# Patient Record
Sex: FEMALE | Race: WHITE | NOT HISPANIC OR LATINO | Employment: FULL TIME | URBAN - METROPOLITAN AREA
[De-identification: names, ages, dates, MRNs, and addresses within clinical notes are randomized per-mention and may not be internally consistent; named-entity substitution may affect disease eponyms.]

---

## 2019-08-13 ENCOUNTER — OFFICE VISIT (OUTPATIENT)
Dept: FAMILY MEDICINE | Facility: CLINIC | Age: 24
End: 2019-08-13
Payer: COMMERCIAL

## 2019-08-13 VITALS
DIASTOLIC BLOOD PRESSURE: 62 MMHG | OXYGEN SATURATION: 98 % | WEIGHT: 155 LBS | SYSTOLIC BLOOD PRESSURE: 90 MMHG | TEMPERATURE: 97.9 F | HEART RATE: 53 BPM | BODY MASS INDEX: 24.33 KG/M2 | HEIGHT: 67 IN

## 2019-08-13 DIAGNOSIS — D68.51 FACTOR 5 LEIDEN MUTATION, HETEROZYGOUS (H): Primary | ICD-10-CM

## 2019-08-13 DIAGNOSIS — M67.40 GANGLION CYST: ICD-10-CM

## 2019-08-13 PROCEDURE — 99213 OFFICE O/P EST LOW 20 MIN: CPT | Performed by: NURSE PRACTITIONER

## 2019-08-13 RX ORDER — ADAPALENE GEL USP, 0.3% 3 MG/G
GEL TOPICAL AT BEDTIME
COMMUNITY
End: 2023-06-29

## 2019-08-13 ASSESSMENT — MIFFLIN-ST. JEOR: SCORE: 1481.74

## 2019-10-24 ENCOUNTER — OFFICE VISIT (OUTPATIENT)
Dept: HEMATOLOGY | Facility: CLINIC | Age: 24
End: 2019-10-24
Attending: NURSE PRACTITIONER
Payer: COMMERCIAL

## 2019-10-24 VITALS
SYSTOLIC BLOOD PRESSURE: 118 MMHG | WEIGHT: 155.2 LBS | HEIGHT: 67 IN | DIASTOLIC BLOOD PRESSURE: 70 MMHG | RESPIRATION RATE: 14 BRPM | OXYGEN SATURATION: 99 % | BODY MASS INDEX: 24.36 KG/M2 | HEART RATE: 61 BPM

## 2019-10-24 DIAGNOSIS — D68.51 FACTOR 5 LEIDEN MUTATION, HETEROZYGOUS (H): ICD-10-CM

## 2019-10-24 PROCEDURE — 99203 OFFICE O/P NEW LOW 30 MIN: CPT | Performed by: INTERNAL MEDICINE

## 2019-10-24 PROCEDURE — G0463 HOSPITAL OUTPT CLINIC VISIT: HCPCS

## 2019-10-24 ASSESSMENT — PAIN SCALES - GENERAL: PAINLEVEL: NO PAIN (0)

## 2019-10-24 ASSESSMENT — MIFFLIN-ST. JEOR: SCORE: 1481.61

## 2019-10-24 NOTE — PROGRESS NOTES
[facility]  [address] [place] [id number]  Phone: [phone], Fax: [phone]    Outpatient Visit Note:    Patient: Margot Menezes  MRN: [id number]  : 1995  ANCA: 2019    Reason for Consultation:  Margot Menezes is a referred by Daly Orta NP for evaluation and treatment of FVL heterozygosity.    History of Present Illness:  Margot Menezes is a 24 year old healthy woman who was found to have FVL heterozygosity after her father tested positive for this after unprovoked VTE.  She reports no personal history of VTE.  She is primarily interested in understanding the significance of factor V Leiden and whether this would require any primary prophylactic therapy.  She wonders if this is any impact on her health risk for venous thrombosis.    Past Medical History:  Past Medical History:   Diagnosis Date     Factor 5 Leiden mutation, heterozygous (H)        Past Surgical History:  Past Surgical History:   Procedure Laterality Date     MAMMOPLASTY REDUCTION BILATERAL       TONSILLECTOMY         Medications:  Current Outpatient Medications   Medication Sig     adapalene (DIFFERIN) 0.3 % external gel Apply topically At Bedtime     No current facility-administered medications for this visit.        Allergies:  No Known Allergies    ROS:  A 14 point ROS is negative except as stated in the HPI    Social History:  Denies any tobacco use. No significant alcohol use. Denies any illicit drug use.    Family History:  The only family member she is aware of that has had a blood clot as her father who had a recently diagnosed unprovoked pulmonary embolism    Objective:  Vitals: B/P: 118/70, T: Data Unavailable, P: 61, R: 14, Wt: 155 lbs 3.2 oz Body mass index is 24.31 kg/m .   Exam:   Gen: Appears well, no distress  Remainder of the exam is deferred as the visit is primarily for counseling    Labs:  She is heterozygous for factor V  Leiden    Imaging:  None    Assessment:  In summary, Margot Menezes is a 24 year old woman with FVL heterozygosity and family history of unprovoked VTE in her father.  She is not at sufficient risk to justify primary prophylaxis with anticoagulation but I encouraged her to avoid estrogen (OCP) and consider prophylaxis with pregnancy (6 weeks post partum is likely sufficient vs careful observation) and surgery/hospitalzation    Plan:  1. Majority of today's visit was spent counseling the patient regarding the clinical relevance of FVL heterozygosity  2. Avoid estrogen use of possible (we discussed alternatives including progestin-only pilss and devices as well as IUDs)  3. Consider prophylactic anticoagulation 6 weeks post partum (or during pregnancy particularly if her family history changes)  4. Prophylaxis with surgery or hospitalization.  5. Gave her some literature about inherited thromobophilias in current practice for her reading.    The patient is given our center's contact information and is instructed to call if she should have any further questions or concerns.      Total Time Spent:  I spent a total of 30 minutes face-to-face with Margot Menezes during today's office visit.  Over 50% of this time was spent counseling the patient and/or coordinating care regarding FV Leiden and risk of VTE.        Sim Rivera MD   of Medicine  [facility]

## 2019-10-25 NOTE — NURSING NOTE
Ms. Menezes is a 24 year old woman heterozygote for Factor V Leiden and family history of unprovoked VTE.   VTE risk factors, signs, and symptoms reviewed.  Heterozygosity for Factor V Leiden as a VTE risk factor discussed.  Factor V Leiden and concerns for increased VTE risk with estrogen exposure reviewed.   Periods of increased VTE risk and role of pharmacological VTE prophylaxis discussed.  AVS provided.  Ms. Menezes has the contact information for the Center and was encouraged to call with questions or concerns.

## 2020-07-30 ENCOUNTER — OFFICE VISIT (OUTPATIENT)
Dept: FAMILY MEDICINE | Facility: CLINIC | Age: 25
End: 2020-07-30
Payer: COMMERCIAL

## 2020-07-30 VITALS
SYSTOLIC BLOOD PRESSURE: 120 MMHG | WEIGHT: 147 LBS | RESPIRATION RATE: 16 BRPM | BODY MASS INDEX: 23.07 KG/M2 | DIASTOLIC BLOOD PRESSURE: 77 MMHG | TEMPERATURE: 97.3 F | HEART RATE: 64 BPM | HEIGHT: 67 IN

## 2020-07-30 DIAGNOSIS — Z12.4 SCREENING FOR CERVICAL CANCER: ICD-10-CM

## 2020-07-30 DIAGNOSIS — Z00.00 ROUTINE GENERAL MEDICAL EXAMINATION AT A HEALTH CARE FACILITY: Primary | ICD-10-CM

## 2020-07-30 DIAGNOSIS — M76.32 IT BAND SYNDROME, LEFT: ICD-10-CM

## 2020-07-30 PROCEDURE — 90715 TDAP VACCINE 7 YRS/> IM: CPT | Performed by: FAMILY MEDICINE

## 2020-07-30 PROCEDURE — G0145 SCR C/V CYTO,THINLAYER,RESCR: HCPCS | Performed by: FAMILY MEDICINE

## 2020-07-30 PROCEDURE — 99395 PREV VISIT EST AGE 18-39: CPT | Mod: 25 | Performed by: FAMILY MEDICINE

## 2020-07-30 PROCEDURE — 90651 9VHPV VACCINE 2/3 DOSE IM: CPT | Performed by: FAMILY MEDICINE

## 2020-07-30 PROCEDURE — 90471 IMMUNIZATION ADMIN: CPT | Performed by: FAMILY MEDICINE

## 2020-07-30 PROCEDURE — 90472 IMMUNIZATION ADMIN EACH ADD: CPT | Performed by: FAMILY MEDICINE

## 2020-07-30 ASSESSMENT — MIFFLIN-ST. JEOR: SCORE: 1436.48

## 2020-07-30 NOTE — NURSING NOTE
Prior to immunization administration, verified patients identity using patient s name and date of birth. Please see Immunization Activity for additional information.     Screening Questionnaire for Adult Immunization    Are you sick today?   No   Do you have allergies to medications, food, a vaccine component or latex?   No   Have you ever had a serious reaction after receiving a vaccination?   No   Do you have a long-term health problem with heart, lung, kidney, or metabolic disease (e.g., diabetes), asthma, a blood disorder, no spleen, complement component deficiency, a cochlear implant, or a spinal fluid leak?  Are you on long-term aspirin therapy?   No   Do you have cancer, leukemia, HIV/AIDS, or any other immune system problem?   No   Do you have a parent, brother, or sister with an immune system problem?   No   In the past 3 months, have you taken medications that affect  your immune system, such as prednisone, other steroids, or anticancer drugs; drugs for the treatment of rheumatoid arthritis, Crohn s disease, or psoriasis; or have you had radiation treatments?   No   Have you had a seizure, or a brain or other nervous system problem?   No   During the past year, have you received a transfusion of blood or blood    products, or been given immune (gamma) globulin or antiviral drug?   No   For women: Are you pregnant or is there a chance you could become       pregnant during the next month?   No   Have you received any vaccinations in the past 4 weeks?   No     Immunization questionnaire answers were all negative.        Per orders of Dr. Shell, injection of hpv and tdap given by Yudelka Leonard MA. Patient instructed to remain in clinic for 15 minutes afterwards, and to report any adverse reaction to me immediately.       Screening performed by Yudelka Leonard MA on 7/30/2020 at 9:28 AM.

## 2020-07-30 NOTE — PROGRESS NOTES
SUBJECTIVE:   CC: Margot Menezes is an 24 year old woman who presents for preventive health visit.     Healthy Habits:    Do you get at least three servings of calcium containing foods daily (dairy, green leafy vegetables, etc.)? yes    Amount of exercise or daily activities, outside of work: 7 day(s) per week    Problems taking medications regularly No    Medication side effects: No    Have you had an eye exam in the past two years? no    Do you see a dentist twice per year? yes    Do you have sleep apnea, excessive snoring or daytime drowsiness?no    Current Chronic Medical Conditions  Factor V Leiden, heterozygous 2013    Family History  Dad- Factor V Leiden mutation  Mom- HTN  One younger brother    Social History  [facility]phros [facility] and [place] [employer] [place] [employer]. Lives alone.  Sexually active. Runner.  Vegetarian.  Mom is pharmacist.  Went to [employer].    HCM   Desires IUD.  IT band syndrome LEFT LE- had to stop running in March- feels it is improving but worried it might come back.  Has tried exercises at home.  Might be interest in PT.    Due for PAP and Tdap and HPV today.  Defers labs.    Today's PHQ-2 Score:   PHQ-2 ( 1999 Pfizer) 8/13/2019   Q1: Little interest or pleasure in doing things 0   Q2: Feeling down, depressed or hopeless 0   PHQ-2 Score 0       Abuse: Current or Past(Physical, Sexual or Emotional)- No  Do you feel safe in your environment? Yes        Social History     Tobacco Use     Smoking status: Never Smoker     Smokeless tobacco: Never Used   Substance Use Topics     Alcohol use: Yes     Comment: 2-3 drinks a week     If you drink alcohol do you typically have >3 drinks per day or >7 drinks per week? No                     Reviewed orders with patient.  Reviewed health maintenance and updated orders accordingly - Yes  BP Readings from Last 3 Encounters:   07/30/20 120/77   10/24/19 118/70   08/13/19  90/62    Wt Readings from Last 3 Encounters:   07/30/20 66.7 kg (147 lb)   10/24/19 70.4 kg (155 lb 3.2 oz)   08/13/19 70.3 kg (155 lb)                  Patient Active Problem List   Diagnosis     Factor 5 Leiden mutation, heterozygous (H)     Ganglion cyst     Past Surgical History:   Procedure Laterality Date     MAMMOPLASTY REDUCTION BILATERAL       TONSILLECTOMY         Social History     Tobacco Use     Smoking status: Never Smoker     Smokeless tobacco: Never Used   Substance Use Topics     Alcohol use: Yes     Comment: 2-3 drinks a week     Family History   Problem Relation Age of Onset     Hypertension Mother      Factor IX deficiency Father      Charcot-Alka-Tooth disease Brother          Current Outpatient Medications   Medication Sig Dispense Refill     adapalene (DIFFERIN) 0.3 % external gel Apply topically At Bedtime       No Known Allergies  No lab results found.     Mammogram not appropriate for this patient based on age.    Pertinent mammograms are reviewed under the imaging tab.  History of abnormal Pap smear: NO - age 21-29 PAP every 3 years recommended     Reviewed and updated as needed this visit by clinical staff         Reviewed and updated as needed this visit by Provider            ROS:  CONSTITUTIONAL: NEGATIVE for fever, chills, change in weight  INTEGUMENTARU/SKIN: NEGATIVE for worrisome rashes, moles or lesions  EYES: NEGATIVE for vision changes or irritation  ENT: NEGATIVE for ear, mouth and throat problems  RESP: NEGATIVE for significant cough or SOB  BREAST: NEGATIVE for masses, tenderness or discharge  CV: NEGATIVE for chest pain, palpitations or peripheral edema  GI: NEGATIVE for nausea, abdominal pain, heartburn, or change in bowel habits  : NEGATIVE for unusual urinary or vaginal symptoms. Periods are regular.  MUSCULOSKELETAL: NEGATIVE for significant arthralgias or myalgia  NEURO: NEGATIVE for weakness, dizziness or paresthesias  PSYCHIATRIC: NEGATIVE for changes in mood or  "affect    OBJECTIVE:   /77   Pulse 64   Temp 97.3  F (36.3  C) (Oral)   Resp 16   Ht 1.689 m (5' 6.5\")   Wt 66.7 kg (147 lb)   LMP 07/17/2020   BMI 23.37 kg/m      EXAM:  GENERAL: healthy, alert and no distress  EYES: Eyes grossly normal to inspection, PERRL and conjunctivae and sclerae normal  HENT: ear canals and TM's normal, nose and mouth without ulcers or lesions  NECK: no adenopathy, no asymmetry, masses, or scars and thyroid normal to palpation  RESP: lungs clear to auscultation - no rales, rhonchi or wheezes  BREAST: normal without masses, tenderness or nipple discharge and no palpable axillary masses or adenopathy  CV: regular rate and rhythm, normal S1 S2, no S3 or S4, no murmur, click or rub, no peripheral edema and peripheral pulses strong  ABDOMEN: soft, nontender, no hepatosplenomegaly, no masses and bowel sounds normal   (female): normal female external genitalia, normal urethral meatus, vaginal mucosa pink, moist, well rugated, and normal cervix/adnexa/uterus without masses or discharge  MS: no gross musculoskeletal defects noted, no edema  SKIN: no suspicious lesions or rashes  NEURO: Normal strength and tone, mentation intact and speech normal  PSYCH: mentation appears normal, affect normal/bright    ASSESSMENT/PLAN:   1. Routine general medical examination at a health care facility    - OB/GYN REFERRAL    Referred to OB for IUD.  Continue excellent diet and exercise.    2. Screening for cervical cancer    - PAP imaged thin layer screen reflex to HPV if ASCUS - recommended age 25 - 29 years  - C HUMAN PAPILLOMA VIRUS VACCINE (GARDASIL 9) 3 DOSE IM  - VACCINE ADMINISTRATION, INITIAL  - TDAP VACCINE (ADACEL)    3. It band syndrome, left    - PHYSICAL THERAPY REFERRAL; Future    Referred to PT to prevent recurrent injury.    COUNSELING:   Reviewed preventive health counseling, as reflected in patient instructions       Regular exercise       Healthy diet/nutrition       Contraception    " "   Safe sex practices/STD prevention    Estimated body mass index is 24.31 kg/m  as calculated from the following:    Height as of 10/24/19: 1.702 m (5' 7\").    Weight as of 10/24/19: 70.4 kg (155 lb 3.2 oz).         reports that she has never smoked. She has never used smokeless tobacco.      Counseling Resources:  ATP IV Guidelines  Pooled Cohorts Equation Calculator  Breast Cancer Risk Calculator  FRAX Risk Assessment  ICSI Preventive Guidelines  Dietary Guidelines for Americans, 2010  USDA's MyPlate  ASA Prophylaxis  Lung CA Screening    Ana María Mccarthy MD  [facility]  "

## 2020-08-03 LAB
COPATH REPORT: NORMAL
PAP: NORMAL

## 2020-08-20 ENCOUNTER — THERAPY VISIT (OUTPATIENT)
Dept: PHYSICAL THERAPY | Facility: CLINIC | Age: 25
End: 2020-08-20
Payer: COMMERCIAL

## 2020-08-20 DIAGNOSIS — M76.32 ILIOTIBIAL BAND SYNDROME, LEFT: Primary | ICD-10-CM

## 2020-08-20 PROCEDURE — 97112 NEUROMUSCULAR REEDUCATION: CPT | Mod: GP | Performed by: PHYSICAL THERAPIST

## 2020-08-20 PROCEDURE — 97161 PT EVAL LOW COMPLEX 20 MIN: CPT | Mod: GP | Performed by: PHYSICAL THERAPIST

## 2020-08-20 ASSESSMENT — ACTIVITIES OF DAILY LIVING (ADL)
HOW_WOULD_YOU_RATE_THE_CURRENT_FUNCTION_OF_YOUR_KNEE_DURING_YOUR_USUAL_DAILY_ACTIVITIES_ON_A_SCALE_FROM_0_TO_100_WITH_100_BEING_YOUR_LEVEL_OF_KNEE_FUNCTION_PRIOR_TO_YOUR_INJURY_AND_0_BEING_THE_INABILITY_TO_PERFORM_ANY_OF_YOUR_USUAL_DAILY_ACTIVITIES?: 100
RAW_SCORE: 67
KNEE_ACTIVITY_OF_DAILY_LIVING_SUM: 67
STAND: ACTIVITY IS NOT DIFFICULT
SQUAT: ACTIVITY IS NOT DIFFICULT
WEAKNESS: I HAVE THE SYMPTOM BUT IT DOES NOT AFFECT MY ACTIVITY
HOW_WOULD_YOU_RATE_THE_OVERALL_FUNCTION_OF_YOUR_KNEE_DURING_YOUR_USUAL_DAILY_ACTIVITIES?: NEARLY NORMAL
WALK: ACTIVITY IS NOT DIFFICULT
AS_A_RESULT_OF_YOUR_KNEE_INJURY,_HOW_WOULD_YOU_RATE_YOUR_CURRENT_LEVEL_OF_DAILY_ACTIVITY?: NEARLY NORMAL
GO DOWN STAIRS: ACTIVITY IS NOT DIFFICULT
KNEE_ACTIVITY_OF_DAILY_LIVING_SCORE: 95.71
KNEEL ON THE FRONT OF YOUR KNEE: ACTIVITY IS NOT DIFFICULT
LIMPING: I DO NOT HAVE THE SYMPTOM
GIVING WAY, BUCKLING OR SHIFTING OF KNEE: I DO NOT HAVE THE SYMPTOM
STIFFNESS: THE SYMPTOM AFFECTS MY ACTIVITY SLIGHTLY
PAIN: I DO NOT HAVE THE SYMPTOM
SIT WITH YOUR KNEE BENT: ACTIVITY IS NOT DIFFICULT
GO UP STAIRS: ACTIVITY IS NOT DIFFICULT
SWELLING: I DO NOT HAVE THE SYMPTOM
RISE FROM A CHAIR: ACTIVITY IS NOT DIFFICULT

## 2020-08-20 NOTE — PROGRESS NOTES
Physical Therapy Initial Evaluation  August 20, 2020     Precautions/Restrictions/MD instructions: PT eval and treat.     Subjective:   Date of Onset: March 2020, MD order on 7/30/20  C/C: left lateral hip and thigh pain, posterior thigh pain, and occasional calf pain  Quality of pain is aching and tightness. Pains are described as intermittent in nature. Pain is worse: on the go. Tightness is rated 1-3/10.  Pain was rated 0-6/10.   History of symptoms: Pains began gradually as the result of training for a marathon. Since onset, symptoms are improving. Previous episodes: mild anterior knee pain in 2018 following agility work outs  Worsened by: walking, running, sitting,   Alleviated by:    Rest period - did not help much, foam rolling, strength/stretching  General health as reported by patient: excellent  Pertinent medical/surgical history: She denies pertinent PMH.   Imaging: none. Current occupational status: . Patient's goals are: decrease pain, improve tolerance to sitting the day after running, resume training for and running a marathon without risk of injury. Return to MD:  PRN.         Objective:  KNEE:    PROM:   L  R   Hyperextension 0 0   Extension 0 0   Flexion 140 140     Strength:   L R   HIP     Flex 4+/5 4+/5   Ext 4-/5 4+/5   ER 3+/5 4+/5   Abd 4-/5 4+/5   KNEE     Flex 5/5 5/5   Ext 5/5 5/5     Hip PROM:     L R   IR 60 45   ER 60 45   Flexion 135 135     Lumbar ROM: WNL - does not affect symptoms    Assessment/Plan:    The patient is a 24 year old female with chief complaint of left leg pain consistent with ITB syndrome vs micro instability at her left hip.    The patient has the following significant findings with corresponding treatment plan.  Diagnosis 1:  L ITB syndrome    Pain -  hot/cold therapy, manual therapy, splint/taping/bracing/orthotics and home program  Decreased strength - therapeutic exercise and therapeutic activities  Impaired balance - neuro  re-education and therapeutic activities  Decreased proprioception - neuro re-education and therapeutic activities  Impaired muscle performance - neuro re-education  Decreased function - therapeutic activities  Impaired posture - neuro re-education          Therapy Evaluation Codes:     Cumulative Therapy Evaluation is: Low complexity.    Previous and current functional limitations:  (See Goal Flow Sheet for this information)    Short term and Long term goals: (See Goal Flow Sheet for this information)     Communication ability:  Patient appears to be able to clearly communicate and understand verbal and written communication and follow directions correctly.  Treatment Explanation - The following has been discussed with the patient: RX ordered/plan of care, anticipated outcomes, and possible risks and side effects.  This patient would benefit from PT intervention to resume normal activities.   Rehab potential is good.    Frequency:  2 X a month, once daily  Duration:  for 2 months  Discharge Plan: Achieve all LTGs, be independent in home treatment program, and reach maximal therapeutic benefit.    Please refer to the daily flowsheet for treatment today, total treatment time and time spent performing 1:1 timed codes.

## 2020-08-21 ENCOUNTER — OFFICE VISIT (OUTPATIENT)
Dept: OBGYN | Facility: CLINIC | Age: 25
End: 2020-08-21
Payer: COMMERCIAL

## 2020-08-21 VITALS
DIASTOLIC BLOOD PRESSURE: 76 MMHG | SYSTOLIC BLOOD PRESSURE: 126 MMHG | WEIGHT: 148 LBS | TEMPERATURE: 98.1 F | HEART RATE: 61 BPM | BODY MASS INDEX: 23.53 KG/M2

## 2020-08-21 DIAGNOSIS — Z30.430 ENCOUNTER FOR IUD INSERTION: Primary | ICD-10-CM

## 2020-08-21 DIAGNOSIS — Z30.430 ENCOUNTER FOR INSERTION OF INTRAUTERINE CONTRACEPTIVE DEVICE (IUD): ICD-10-CM

## 2020-08-21 LAB — HCG UR QL: NEGATIVE

## 2020-08-21 PROCEDURE — 87491 CHLMYD TRACH DNA AMP PROBE: CPT | Performed by: OBSTETRICS & GYNECOLOGY

## 2020-08-21 PROCEDURE — 58300 INSERT INTRAUTERINE DEVICE: CPT | Performed by: OBSTETRICS & GYNECOLOGY

## 2020-08-21 PROCEDURE — 81025 URINE PREGNANCY TEST: CPT | Performed by: OBSTETRICS & GYNECOLOGY

## 2020-08-21 PROCEDURE — 87591 N.GONORRHOEAE DNA AMP PROB: CPT | Performed by: OBSTETRICS & GYNECOLOGY

## 2020-08-21 NOTE — NURSING NOTE
"Chief Complaint   Patient presents with     IUD     consult/Placement     consult/Placement Mirena NDC:44856-668-76 LOT:[id number] EXP:10/2022  Initial /76 (BP Location: Left arm, Patient Position: Sitting, Cuff Size: Adult Regular)   Pulse 61   Temp 98.1  F (36.7  C) (Oral)   Wt 67.1 kg (148 lb)   BMI 23.53 kg/m   Estimated body mass index is 23.53 kg/m  as calculated from the following:    Height as of 7/30/20: 1.689 m (5' 6.5\").    Weight as of this encounter: 67.1 kg (148 lb).  BP completed using cuff size: regular    Questioned patient about current smoking habits.  Pt. has never smoked.      No obstetric history on file.    The following HM Due: NONE      The following patient reported/Care Every where data was sent to:  P ABSTRACT QUALITY INITIATIVES [[id number]]  RYAN Gleason MA           "

## 2020-08-22 NOTE — PROGRESS NOTES
IUD INSERTION PROCEDURE    Margot Menezes is a 24 year old female  who presents for Mirena IUD insertion.  Indication for IUD insertion is contraception.  No LMP recorded. .  The patient is currently using oral contraceptives for contraception.  She is not in a monogamous sexual relationship.     Lab Results   Component Value Date    PAP NIL 2020     GC/CT today  Results for orders placed or performed in visit on 20   HCG Qual, Urine ([id number])     Status: None   Result Value Ref Range    HCG Qual Urine Negative NEG^Negative       A complete discussion of the risks and benefits of IUD use and the details of the insertion procedure was held with the patient.    All questions were answered.  A consent form was signed.    Prior to the beginning of the procedure the team paused to verify the patient's identity, as well as the procedure to be performed and the site.  All equipment required was ready and available. The patient was positioned appropriately.     IUD Lot # Mirena NDC:61005-236-58 LOT:[id number] EXP:10/2022    The patient was placed in low lithotomy.  A bimanual exam was performed and the uterus noted to be retroverted.  A speculum was placed and the cervix swabbed with Betadine.  A tenaculum was placed on the anterior cervical lip. A paracervical block was performed.  The fundus sounded to 8 cm. The Mirena IUD was placed to the uterine fundus without difficulty.  The strings were cut to 3 cms.  The tenaculum was removed and hemostasis was ensured.  The speculum was removed.  The patient tolerated the procedure well.    PLAN:   The patient was asked to contact the clinic for any fever/chills/severe pelvic or abdominal pain or heavy bleeding. She was instructed in how to palpate her IUD strings.    FOLLOW-UP:  She was asked to follow up prn, and for her routine annual screening.

## 2020-08-23 LAB
C TRACH DNA SPEC QL NAA+PROBE: NEGATIVE
N GONORRHOEA DNA SPEC QL NAA+PROBE: NEGATIVE
SPECIMEN SOURCE: NORMAL
SPECIMEN SOURCE: NORMAL

## 2020-09-11 ENCOUNTER — THERAPY VISIT (OUTPATIENT)
Dept: PHYSICAL THERAPY | Facility: CLINIC | Age: 25
End: 2020-09-11
Payer: COMMERCIAL

## 2020-09-11 DIAGNOSIS — M76.32 ILIOTIBIAL BAND SYNDROME, LEFT: ICD-10-CM

## 2020-09-11 PROCEDURE — 97110 THERAPEUTIC EXERCISES: CPT | Mod: GP | Performed by: PHYSICAL THERAPIST

## 2020-09-11 PROCEDURE — 97112 NEUROMUSCULAR REEDUCATION: CPT | Mod: GP | Performed by: PHYSICAL THERAPIST

## 2020-09-24 ENCOUNTER — THERAPY VISIT (OUTPATIENT)
Dept: PHYSICAL THERAPY | Facility: CLINIC | Age: 25
End: 2020-09-24
Payer: COMMERCIAL

## 2020-09-24 DIAGNOSIS — M76.32 ILIOTIBIAL BAND SYNDROME, LEFT: ICD-10-CM

## 2020-09-24 PROCEDURE — 97140 MANUAL THERAPY 1/> REGIONS: CPT | Mod: GP | Performed by: PHYSICAL THERAPIST

## 2020-09-24 PROCEDURE — 97110 THERAPEUTIC EXERCISES: CPT | Mod: GP | Performed by: PHYSICAL THERAPIST

## 2020-09-24 PROCEDURE — 97112 NEUROMUSCULAR REEDUCATION: CPT | Mod: GP | Performed by: PHYSICAL THERAPIST

## 2020-10-02 ENCOUNTER — TRANSFERRED RECORDS (OUTPATIENT)
Dept: HEALTH INFORMATION MANAGEMENT | Facility: CLINIC | Age: 25
End: 2020-10-02

## 2020-10-09 ENCOUNTER — THERAPY VISIT (OUTPATIENT)
Dept: PHYSICAL THERAPY | Facility: CLINIC | Age: 25
End: 2020-10-09
Payer: COMMERCIAL

## 2020-10-09 DIAGNOSIS — M76.32 ILIOTIBIAL BAND SYNDROME, LEFT: Primary | ICD-10-CM

## 2020-10-09 PROCEDURE — 97110 THERAPEUTIC EXERCISES: CPT | Mod: GP | Performed by: PHYSICAL THERAPIST

## 2020-10-09 PROCEDURE — 97112 NEUROMUSCULAR REEDUCATION: CPT | Mod: GP | Performed by: PHYSICAL THERAPIST

## 2020-10-09 PROCEDURE — 97140 MANUAL THERAPY 1/> REGIONS: CPT | Mod: GP | Performed by: PHYSICAL THERAPIST

## 2020-10-23 ENCOUNTER — THERAPY VISIT (OUTPATIENT)
Dept: PHYSICAL THERAPY | Facility: CLINIC | Age: 25
End: 2020-10-23
Payer: COMMERCIAL

## 2020-10-23 DIAGNOSIS — M76.32 ILIOTIBIAL BAND SYNDROME, LEFT: ICD-10-CM

## 2020-10-23 PROCEDURE — 97110 THERAPEUTIC EXERCISES: CPT | Mod: GP | Performed by: PHYSICAL THERAPIST

## 2020-10-23 PROCEDURE — 97112 NEUROMUSCULAR REEDUCATION: CPT | Mod: GP | Performed by: PHYSICAL THERAPIST

## 2020-10-23 NOTE — PROGRESS NOTES
Subjective:  The history is provided by the patient. No  was used.     Physical Exam                    Objective:  Standing Alignment:    Cervical/Thoracic:  Forward head  Shoulder/UE:  Rounded shoulders  Lumbar:  Normal                                                                  Knee Evaluation:              Functional Testing:          Quad:    Single Leg Squat:  Left:         No pain  Mild loss of control and excessive anterior knee excursion  Right:      No pain  Mild loss of control and excessive anterior knee excursion  Bilateral Leg Squat:  No pain  Normal control              General     ROS    Assessment/Plan:    DISCHARGE REPORT    Progress reporting period is from 8/20/20 to 10/23/20.       SUBJECTIVE  Subjective changes noted by patient:  Pt able to run without pain. She is no longer having pain and tightness with sitting the day after running. She has been performing her HEP. She has been more aware of her posture.       Current Pain level: 0/10.     Initial Pain level: 3/10 tightness at ITB.   Changes in function:  Yes (See Goal flowsheet attached for changes in current functional level)  Adverse reaction to treatment or activity: None    OBJECTIVE  Changes noted in objective findings:  Yes, see objective findings.    ASSESSMENT/PLAN  Updated problem list and treatment plan: Diagnosis 1:  L ITB syndrome  Decreased strength - therapeutic exercise, therapeutic activities and home program  Decreased proprioception - neuro re-education, therapeutic activities and home program  Impaired muscle performance - neuro re-education and home program  Decreased function - therapeutic activities and home program  Impaired posture - neuro re-education and home program  STG/LTGs have been met or progress has been made towards goals:  Yes (See Goal flow sheet completed today.)  Assessment of Progress: The patient's condition is improving.  Self Management Plans:  Patient is independent in a  home treatment program.  Patient is independent in self management of symptoms.  I have re-evaluated this patient and find that the nature, scope, duration and intensity of the therapy is appropriate for the medical condition of the patient.  Margot continues to require the following intervention to meet STG and LTG's:  PT intervention is no longer required to meet STG/LTG.    Recommendations:  This patient is ready to be discharged from therapy and continue their home treatment program.    Please refer to the daily flowsheet for treatment today, total treatment time and time spent performing 1:1 timed codes.

## 2020-10-25 PROBLEM — M76.32 ILIOTIBIAL BAND SYNDROME, LEFT: Status: RESOLVED | Noted: 2020-08-20 | Resolved: 2020-10-25

## 2021-01-03 ENCOUNTER — HEALTH MAINTENANCE LETTER (OUTPATIENT)
Age: 26
End: 2021-01-03

## 2021-02-18 ENCOUNTER — NURSE TRIAGE (OUTPATIENT)
Dept: NURSING | Facility: CLINIC | Age: 26
End: 2021-02-18

## 2021-02-19 ENCOUNTER — OFFICE VISIT (OUTPATIENT)
Dept: FAMILY MEDICINE | Facility: CLINIC | Age: 26
End: 2021-02-19
Payer: COMMERCIAL

## 2021-02-19 VITALS
OXYGEN SATURATION: 99 % | DIASTOLIC BLOOD PRESSURE: 75 MMHG | SYSTOLIC BLOOD PRESSURE: 126 MMHG | BODY MASS INDEX: 24.59 KG/M2 | HEIGHT: 66 IN | TEMPERATURE: 97.7 F | HEART RATE: 62 BPM | WEIGHT: 153 LBS

## 2021-02-19 DIAGNOSIS — R20.0 NUMBNESS OF RIGHT FOOT: ICD-10-CM

## 2021-02-19 DIAGNOSIS — Z82.0 FAMILY HISTORY OF NEUROPATHY: ICD-10-CM

## 2021-02-19 DIAGNOSIS — S93.401A SPRAIN OF RIGHT ANKLE, UNSPECIFIED LIGAMENT, INITIAL ENCOUNTER: Primary | ICD-10-CM

## 2021-02-19 PROCEDURE — 99213 OFFICE O/P EST LOW 20 MIN: CPT | Performed by: INTERNAL MEDICINE

## 2021-02-19 ASSESSMENT — MIFFLIN-ST. JEOR: SCORE: 1458.62

## 2021-02-19 NOTE — TELEPHONE ENCOUNTER
Pt called stating on monday around 5 pm, she fell on her foot/ ankle wrong by tripping over. Pt reported she has a lot of bruising on the top of her right ankle, and moderate swelling. She reported the bruising extends to the back of the heel, middle of the foot, and ankle. Pt reported pain rated 1/10, and she has been taking ibuprofen 600 mg 3 times day.     Pt reported she cant walk much on her right leg only few stapes. Pt reported she was limping on monday but not anymore.  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      Per protocol pt was advised to be seen within 24 hours, pt stated okay, and was transferred to scheduling. Protocol care advice provided and pt verbalized understanding.    Torres Poole RN  [facility]       COVID 19 Nurse Triage Plan/Patient Instructions    Please be aware that novel coronavirus (COVID-19) may be circulating in the community. If you develop symptoms such as fever, cough, or SOB or if you have concerns about the presence of another infection including coronavirus (COVID-19), please contact your health care provider or visit [email or web address].     Disposition/Instructions    In-Person Visit with provider recommended. Reference Visit Selection Guide.    Thank you for taking steps to prevent the spread of this virus.  o Limit your contact with others.  o Wear a simple mask to cover your cough.  o Wash your hands well and often.    Resources    [facility]: About COVID-19: [email or web address]    CDC: What to Do If You're Sick: www.cdc.gov/coronavirus/2019-ncov/about/steps-when-sick.html    CDC: Ending Home Isolation: www.cdc.gov/coronavirus/2019-ncov/hcp/disposition-in-home-patients.html     CDC: Caring for Someone: www.cdc.gov/coronavirus/2019-ncov/if-you-are-sick/care-for-someone.html     [facility]: Interim Guidance for Hospital Discharge to Home: www.health.[facility].[place]/diseases/coronavirus/hcp/hospdischarge.pdf    [facility] clinical trials  (COVID-19 research studies): clinicalaffairs.[facility]-clinical-trials     Below are the COVID-19 hotlines at the [place] Department of Health ([facility]). Interpreters are available.   o For health questions: Call 108-889-7491 or 1-348.141.1671 (7 a.m. to 7 p.m.)  o For questions about schools and childcare: Call 453-831-2981 or 1-788.904.8530 (7 a.m. to 7 p.m.)     Additional Information    Negative: Serious injury with multiple fractures    Negative: [1] Major bleeding (e.g., actively dripping or spurting) AND [2] can't be stopped    Negative: Amputation    Negative: Looks like a dislocated joint (very crooked or deformed)    Negative: Sounds like a life-threatening emergency to the triager    Negative: Wound looks infected    Negative: Caused by an animal bite    Negative: Caused by a human bite    Negative: Puncture wound of foot    Negative: Toe injury is main concern    Negative: Cast problems or questions    Negative: Bullet wound, stabbed by knife, or other serious penetrating wound    Negative: Skin is split open or gaping (or length > 1/2 inch or 12 mm)    Negative: [1] Bleeding AND [2] won't stop after 10 minutes of direct pressure (using correct technique)    Negative: [1] Dirt in the wound AND [2] not removed with 15 minutes of scrubbing    Negative: Can't stand (bear weight) or walk    Negative: [1] Numbness (new loss of sensation) of toe(s) AND [2] present now    Negative: Sounds like a serious injury to the triager    Negative: [1] SEVERE pain AND [2] not improved 2 hours after pain medicine/ice packs    Negative: Suspicious history for the injury    Large swelling or bruise (> 2 inches or 5 cm)    Negative: [1] Limp when walking AND [2] due to a direct blow or crushing injury    Negative: [1] Limp when walking AND [2] due to a twisted ankle or foot    Protocols used: FOOT AND ANKLE INJURY-A-AH

## 2021-02-19 NOTE — PROGRESS NOTES
"    Assessment & Plan   Problem List Items Addressed This Visit        Musculoskeletal and Integumentary    Sprain of right ankle - Primary      Other Visit Diagnoses     Numbness of right foot        Family history of neuropathy             Brother with charcot Alka syndrome tooth neuropathy has has lot of falls,     RICE, ACE WRAP, f/u PT    concern with Fx of neuropathy, Patient reports foot gave away \"foot fell asleep\" and tripped fell, unknown trigger   Injury     Can have see orthopedics,.[facility]port Medicine,     Was concerned with elevated BP , repeat was 126/75, REASSURANCE GIVEN, she will need labs, CMP, lipid,        MEDICATIONS:  Continue current medications without change  Work on weight loss  Regular exercise  See Patient Instructions    Return in about 4 weeks (around 3/19/2021), or if symptoms worsen or fail to improve.    Lesly Arenas MD  [facility] VIDAL Frost is a 25 year old who presents for the following health issues     HPI       Musculoskeletal problem/pain  Onset/Duration: Monday around 5 pm   Description  Location: foot and ankle - right  Joint Swelling: YES- moderate  Redness: YES  Pain: YES  Warmth: no  Intensity:  1/10  Progression of Symptoms:  improving  Accompanying signs and symptoms:   Fevers: no  Numbness/tingling/weakness: YES- NUMBNESS   History  Trauma to the area: YES- fell on her foot/ankle wrong by tripping over  Recent illness:  no  Previous similar problem: no  Previous evaluation:  no  Precipitating or alleviating factors:  Aggravating factors include: can't walk much on right leg, only a few steps, limping on Monday but not anymore  Therapies tried and outcome: Ibuprofen 600 mg TID, ICE, AVOIDED STAYING ON IT    Right foot fell asleep and tripped and ?twisted ankle, pain 1-2 /10 of ankle, has been resting, \"litle numbness and weakness\" can lift the foot, bruising most noticeable, has ammon down  No other joint pain  In NSAIDS 3 x per day " "since Monday   She is distance runner and had tight muscles, no problems , had injury last year IT band, /left side   Brother with Charcot Alka tooth syndrome , no other family history, and falls and gets ankle sprains.  Review of Systems   Constitutional, HEENT, cardiovascular, pulmonary, gi and gu systems are negative, except as otherwise noted.      Objective    /75 (BP Location: Left arm, Patient Position: Sitting)   Pulse 62   Temp 97.7  F (36.5  C) (Temporal)   Ht 1.689 m (5' 6.5\")   Wt 69.4 kg (153 lb)   SpO2 99%   BMI 24.33 kg/m    Body mass index is 24.33 kg/m .  Physical Exam   GENERAL: healthy, alert and no distress  EYES: Eyes grossly normal to inspection, PERRL and conjunctivae and sclerae normal  MS: FROM RIGHT ANKLE. Has point tenderness on right anterolateral area of dorsum of foot distal to lateral malleolus, bruise on lateral area of foot inferior to lateral malleolus and on mid dorsum area of foot   SKIN: no suspicious lesions or rashes and ecchymoses - right foot  NEURO: Normal strength and tone, mentation intact and speech normal  PSYCH: mentation appears normal, affect normal/bright    Office Visit on 08/21/2020   Component Date Value Ref Range Status     HCG Qual Urine 08/21/2020 Negative  NEG^Negative Final    Comment: This test is for screening purposes.  Results should be interpreted along with   the clinical picture.  Confirmation testing is available if warranted by   ordering [id number], HCG Quantitative Pregnancy.       Specimen Descrip 08/21/2020 Cervix   Final     N Gonorrhea PCR 08/21/2020 Negative  NEG^Negative Final    Comment: Negative for N. gonorrhoeae rRNA by transcription mediated amplification.  A negative result by transcription mediated amplification does not preclude   the presence of N. gonorrhoeae infection because results are dependent on   proper and adequate collection, absence of inhibitors, and sufficient rRNA to   be detected.       Specimen Description " 08/21/2020 Cervix   Final     Chlamydia Trachomatis PCR 08/21/2020 Negative  NEG^Negative Final    Comment: Negative for C. trachomatis rRNA by transcription mediated amplification.  A negative result by transcription mediated amplification does not preclude   the presence of C. trachomatis infection because results are dependent on   proper and adequate collection, absence of inhibitors, and sufficient rRNA to   be detected.

## 2021-02-22 ENCOUNTER — NURSE TRIAGE (OUTPATIENT)
Dept: NURSING | Facility: CLINIC | Age: 26
End: 2021-02-22

## 2021-02-23 NOTE — TELEPHONE ENCOUNTER
"Margot reports that her last period was in Dec.    She has a long history of irregular periods that improved as she got older into her 20's    Her menstrual cycle has also been effected because she is a distance runner and sometimes due to stress    She has a Mirena IUD that was inserted 8/21/20    Since then, her periods have been \"all over the place\"  Her last period was in Dec.    She is sexually active and concerned about the possibility of pregnancy.  She has taken a couple pregnancy tests, each about a week apart -- both were negative.    Per protocol, routine office visit advised  She will also message provider via [employer]  Transferred to scheduling    COVID 19 Nurse Triage Plan/Patient Instructions    Please be aware that novel coronavirus (COVID-19) may be circulating in the community. If you develop symptoms such as fever, cough, or SOB or if you have concerns about the presence of another infection including coronavirus (COVID-19), please contact your health care provider or visit [email or web address].     Disposition/Instructions    In-Person Visit with provider recommended. Reference Visit Selection Guide.    Thank you for taking steps to prevent the spread of this virus.  o Limit your contact with others.  o Wear a simple mask to cover your cough.  o Wash your hands well and often.    Resources    [facility]: About COVID-19: [email or web address]    CDC: What to Do If You're Sick: www.cdc.gov/coronavirus/2019-ncov/about/steps-when-sick.html    CDC: Ending Home Isolation: www.cdc.gov/coronavirus/2019-ncov/hcp/disposition-in-home-patients.html     CDC: Caring for Someone: www.cdc.gov/coronavirus/2019-ncov/if-you-are-sick/care-for-someone.html     [facility]: Interim Guidance for Hospital Discharge to Home: www.health.[facility].[place]/diseases/coronavirus/hcp/hospdischarge.pdf    [facility] clinical trials (COVID-19 research studies): clinicalaffairs.[facility]/umn-clinical-trials     Below " are the COVID-19 hotlines at the [place] Department of Health ([facility]). Interpreters are available.   o For health questions: Call 807-261-1412 or 1-957.540.8066 (7 a.m. to 7 p.m.)  o For questions about schools and childcare: Call 595-782-7891 or 1-185.588.6970 (7 a.m. to 7 p.m.)     Jacque Dangelo RN  [facility]      Reason for Disposition    [1] Bleeding or spotting between periods since IUD was placed AND [2]  more than 3 months (3 menstrual cycles) since IUD was placed    Additional Information    Negative: Shock suspected (e.g., cold/pale/clammy skin, too weak to stand, low BP, rapid pulse)    Negative: Sounds like a life-threatening emergency to the triager    Negative: [1] SEVERE abdominal pain (e.g., excruciating) AND [2] present > 1 hour    Negative: SEVERE vaginal bleeding (i.e., soaking 2 pads or tampons per hour and present 2 or more hours)    Negative: Patient sounds very sick or weak to the triager    Negative: MODERATE vaginal bleeding (i.e., soaking 1 pad or tampon per hour and present > 6 hours)    Negative: [1] Constant abdominal pain AND [2] present > 2 hours    Negative: [1] Caller has NON-URGENT question AND [2] triager unable to answer question    Negative: [1] MILD abdominal pain (e.g., does not interfere with normal activities) AND [2] pain comes and goes (cramps) AND [3] present > 48 hours    Negative: [1] Caller has URGENT question AND [2] triager unable to answer question    Negative: Pregnant    Negative: [1] Periods with > 6 soaked pads or tampons per day AND [2] last > 7 days    Negative: Worried that IUD is not in right place (e.g., not able to feel the IUD string, string longer than usual, can feel hard plastic of IUD)    Negative: IUD came out (e.g., has IUD in her hand)    Negative: Bad smelling vaginal discharge    Negative: Abnormal color vaginal discharge (i.e., yellow, green, gray)    Negative: Periods with > 6 soaked pads or tampons per day     Negative: Periods last > 7 days    Negative: [1] Periods are WORSE (more bleeding or pain) since IUD was placed AND [2] more than 3 months (3 menstrual cycles) since IUD was placed    Protocols used: CONTRACEPTION - IUD SYMPTOMS AND [id number]

## 2021-04-08 ENCOUNTER — NURSE TRIAGE (OUTPATIENT)
Dept: NURSING | Facility: CLINIC | Age: 26
End: 2021-04-08

## 2021-04-09 NOTE — TELEPHONE ENCOUNTER
"Pt c/o residual issues with ankle since spraining it earlier this year. Pt states she is very physically active and has noticed \"foot feels different on R side vs L side and \"should I be concerned about nerve damage\".     Writer advised pt to f/u with PCP and call back if any worsening prior to being seen.     Pt verbalizes understanding and agrees to plan.     Reason for Disposition    [1] Follow-up call from patient regarding patient's clinical status AND [2] information NON-URGENT    Protocols used: PCP CALL - NO TRIAGE-A-AH      "

## 2021-04-23 ENCOUNTER — OFFICE VISIT (OUTPATIENT)
Dept: FAMILY MEDICINE | Facility: CLINIC | Age: 26
End: 2021-04-23
Payer: COMMERCIAL

## 2021-04-23 VITALS
HEIGHT: 68 IN | HEART RATE: 68 BPM | SYSTOLIC BLOOD PRESSURE: 110 MMHG | TEMPERATURE: 97.3 F | OXYGEN SATURATION: 99 % | BODY MASS INDEX: 23.49 KG/M2 | RESPIRATION RATE: 14 BRPM | WEIGHT: 155 LBS | DIASTOLIC BLOOD PRESSURE: 60 MMHG

## 2021-04-23 DIAGNOSIS — M25.571 PAIN IN JOINT INVOLVING ANKLE AND FOOT, RIGHT: Primary | ICD-10-CM

## 2021-04-23 PROCEDURE — 99213 OFFICE O/P EST LOW 20 MIN: CPT | Performed by: FAMILY MEDICINE

## 2021-04-23 ASSESSMENT — MIFFLIN-ST. JEOR: SCORE: 1483.64

## 2021-04-23 NOTE — PROGRESS NOTES
"    Assessment & Plan     Pain in joint involving ankle and foot, right    Recommend Follow-up with PT as already scheduled to work on further strengthening with the balance board s/p recent strain 8 weeks ago.  Follow-up if no change or worsening symptoms prn.    20 minutes spent on the date of the encounter doing chart review, patient visit and documentation     Ana Mraía Mccarthy MD  [facility] RUKHSANA Frost is a 25 year old who presents for the following health issues     HPI   RIGHT ankle sprain 2-.  Still feels weak-- pain with running.    Patient is avid runner.        Review of Systems   Constitutional, HEENT, cardiovascular, pulmonary, GI, , musculoskeletal, neuro, skin, endocrine and psych systems are negative, except as otherwise noted.      Objective    /60 (BP Location: Right arm, Patient Position: Chair, Cuff Size: Adult Regular)   Pulse 68   Temp 97.3  F (36.3  C) (Tympanic)   Resp 14   Ht 1.715 m (5' 7.5\")   Wt 70.3 kg (155 lb)   LMP 04/19/2021 (Approximate)   SpO2 99%   BMI 23.92 kg/m    Body mass index is 23.92 kg/m .  Physical Exam   GENERAL: healthy, alert and no distress  EYES: Eyes grossly normal to inspection, PERRL and conjunctivae and sclerae normal  NECK: no adenopathy, no asymmetry, masses, or scars and thyroid normal to palpation  MS: no gross musculoskeletal defects noted, no edema  SKIN: no suspicious lesions or rashes  NEURO: Normal strength and tone, mentation intact and speech normal  PSYCH: mentation appears normal, affect normal/bright                "

## 2021-04-23 NOTE — PROGRESS NOTES
{PROVIDER CHARTING PREFERENCE:895578}    Subjective   Margot is a 25 year old who presents for the following health issues {ACCOMPANIED BY STATEMENT (Optional):354494}    HPI       {SUPERLIST (Optional):015262}  {additonal problems for provider to add (Optional):409242}    Review of Systems   {ROS COMP (Optional):076339}      Objective    There were no vitals taken for this visit.  There is no height or weight on file to calculate BMI.  Physical Exam   {Exam List (Optional):402607}    {Diagnostic Test Results (Optional):234535}    {AMBULATORY ATTESTATION (Optional):298713}

## 2021-04-30 ENCOUNTER — THERAPY VISIT (OUTPATIENT)
Dept: PHYSICAL THERAPY | Facility: CLINIC | Age: 26
End: 2021-04-30
Payer: COMMERCIAL

## 2021-04-30 DIAGNOSIS — M25.571 PAIN IN JOINT INVOLVING ANKLE AND FOOT, RIGHT: ICD-10-CM

## 2021-04-30 PROCEDURE — 97161 PT EVAL LOW COMPLEX 20 MIN: CPT | Mod: GP | Performed by: PHYSICAL THERAPIST

## 2021-04-30 PROCEDURE — 97110 THERAPEUTIC EXERCISES: CPT | Mod: GP | Performed by: PHYSICAL THERAPIST

## 2021-04-30 NOTE — PROGRESS NOTES
Physical Therapy Initial Evaluation  Subjective:  The history is provided by the patient. No  was used.   Patient Health History  Margot Menezes being seen for Sprained ankle.     Problem began: 2/15/2021.   Problem occurred: I tripped over my right foot, which had fallen asleep. When I tripped, I landed on my ankle and sprained it.   Pain is reported as 2/10 on pain scale.  General health as reported by patient is good.  Pertinent medical history includes: none.   Red flags:  None as reported by patient.  Medical allergies: none.   Surgeries include:  Other. Other surgery history details: Bi-lateral breast reduction, Removal of tonsils and adenoids, Removal of ganglion cyst.    Current medications:  Other. Other medications details: Adapalene gel (acne).    Current occupation is .   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                  Therapist Generated HPI Evaluation  Problem details: 2/15/21. Pt sprained her R ankle at home on 2/15/21 when she tripped over her R foot after it had fallen asleep. Her R ankle has improved since her injury. She does have ongoing R ankle soreness and sensitivity. She has been limited with her Yoga program and exercise. She does like to run and walk..         Type of problem:  Right ankle.    This is a chronic condition.  Condition occurred with:  A twist.  Where condition occurred: at home.  Patient reports pain:  Anterior.  Pain is described as other (soreness, sensitivity) and is intermittent.  Pain radiates to:  Foot. Pain is worse during the day.  Progression since onset: improved since injury, currently staying same.  Associated symptoms:  Numbness, loss of strength and loss of motion/stiffness. Symptoms are exacerbated by other (Yoga poses, running on uneven surfaces)  and relieved by rest.      Restrictions due to condition include:  Working in normal job without restrictions.  Barriers include:  None  as reported by patient.                        Objective:    Gait:    Gait Type:  Normal   Assistive Devices:  None  Deviations:  Ankle:  Medial heel whip L and medial heel whip RGeneral Deviations:  Toe out R          Ankle/Foot Evaluation  ROM:    AROM:    Dorsiflexion:  Left:   WNL  Right:   Slight loss -  Plantarflexion:  Left:  WNL    Right:  Slight loss -              LIGAMENT TESTING:   Anterior Drawer (ATF) Left: neg   Anterior Drawer (ATF) Right: pos  Posterior Drawer (PTF) Left: neg   Posterior Drawer (PTF) Right: neg  Varus Stress (Calc Fib) Left: neg    Varus Stress (Calc Fib) Right: neg  Valgus Stress (Deltoid) Left: neg    Valgus Stress (Deltoid) Right: neg  Rotation (Deltoid) Left: neg    Rotation (Deltoid) Right: neg  External Rotation (High Ankle) Left: neg     External Rotation (High Ankle) Right: neg    PALPATION:     Right ankle tenderness present at:   anterior talofibular ligament  Right ankle tenderness not present at:  deltoid ligament; posterior talofibular ligament; calcaneofibular ligament; medial malleolus or lateral malleolus  EDEMA:     Left ankle edema not present at: lateral  Right ankle edema present at:  lateral        MOBILITY TESTING:     Tib-Fib Distal Left: normal    Tib-Fib Distal Right: hypomobile  Talocrural Left: normal    Talocrural Right: hypomobile  Subtalar Left: normal    Subtalar Right: hypomobile  Midtarsal Left: normal    Midtarsal Right: normal  First Ray Left: normal    First Ray Right: normal                                                    General     ROS    Assessment/Plan:    Patient is a 25 year old female with right side ankle complaints.    Patient has the following significant findings with corresponding treatment plan.                Diagnosis 1:  Pain in joint involving ankle and foot, right  Pain -  hot/cold therapy, manual therapy, splint/taping/bracing/orthotics, self management, education and home program  Decreased ROM/flexibility - manual therapy,  therapeutic exercise and home program  Decreased proprioception - neuro re-education, gait training, therapeutic activities and home program  Edema - cold therapy and self management/home program  Impaired gait - gait training and home program  Impaired muscle performance - neuro re-education and home program    Therapy Evaluation Codes:   1) History comprised of:   Personal factors that impact the plan of care:      None.    Comorbidity factors that impact the plan of care are:      None.     Medications impacting care: None.  2) Examination of Body Systems comprised of:   Body structures and functions that impact the plan of care:      Ankle.   Activity limitations that impact the plan of care are:      Running and Yoga.  3) Clinical presentation characteristics are:   Stable/Uncomplicated.  4) Decision-Making    Low complexity using standardized patient assessment instrument and/or measureable assessment of functional outcome.  Cumulative Therapy Evaluation is: Low complexity.    Previous and current functional limitations:  (See Goal Flow Sheet for this information)    Short term and Long term goals: (See Goal Flow Sheet for this information)     Communication ability:  Patient appears to be able to clearly communicate and understand verbal and written communication and follow directions correctly.  Treatment Explanation - The following has been discussed with the patient:   RX ordered/plan of care  Anticipated outcomes  Possible risks and side effects  This patient would benefit from PT intervention to resume normal activities.   Rehab potential is good.    Frequency:  1 X every other week, once daily  Duration:  for 12 weeks  Discharge Plan:  Achieve all LTG.  Independent in home treatment program.  Reach maximal therapeutic benefit.    Please refer to the daily flowsheet for treatment today, total treatment time and time spent performing 1:1 timed codes.

## 2021-05-02 DIAGNOSIS — M25.571 PAIN IN JOINT INVOLVING ANKLE AND FOOT, RIGHT: Primary | ICD-10-CM

## 2021-05-04 PROBLEM — M25.571 PAIN IN JOINT INVOLVING ANKLE AND FOOT, RIGHT: Status: ACTIVE | Noted: 2021-05-04

## 2021-05-10 ENCOUNTER — THERAPY VISIT (OUTPATIENT)
Dept: PHYSICAL THERAPY | Facility: CLINIC | Age: 26
End: 2021-05-10
Payer: COMMERCIAL

## 2021-05-10 DIAGNOSIS — M25.571 PAIN IN JOINT INVOLVING ANKLE AND FOOT, RIGHT: ICD-10-CM

## 2021-05-10 PROCEDURE — 97112 NEUROMUSCULAR REEDUCATION: CPT | Mod: GP | Performed by: PHYSICAL THERAPIST

## 2021-05-10 PROCEDURE — 97110 THERAPEUTIC EXERCISES: CPT | Mod: GP | Performed by: PHYSICAL THERAPIST

## 2021-05-10 PROCEDURE — 97140 MANUAL THERAPY 1/> REGIONS: CPT | Mod: GP | Performed by: PHYSICAL THERAPIST

## 2021-10-10 ENCOUNTER — HEALTH MAINTENANCE LETTER (OUTPATIENT)
Age: 26
End: 2021-10-10

## 2022-01-25 PROBLEM — M25.571 PAIN IN JOINT INVOLVING ANKLE AND FOOT, RIGHT: Status: RESOLVED | Noted: 2021-05-04 | Resolved: 2022-01-25

## 2022-01-26 NOTE — PROGRESS NOTES
Discharge Note    Progress reporting period is from initial evaluation date (please see noted date below) to May 10, 2021.  Linked Episodes   Type: Episode: Status: Noted: Resolved: Last update: Updated by:   PHYSICAL THERAPY R ankle 04/30/2021 Active 4/30/2021  5/10/2021 11:41 AM Rudolph Ramachandran, PT      Comments:       Margot failed to follow up and current status is unknown.  Please see information below for last relevant information on current status.  Patient seen for 2 visits.    SUBJECTIVE  Subjective changes noted by patient:  Locates ongoing tingling over dorsal aspect of R forefoot and great toe. This sensation is intermittent.  .  Current pain level is 0/10.     Previous pain level was  2/10.   Changes in function:  Yes (See Goal flowsheet attached for changes in current functional level)  Adverse reaction to treatment or activity: None    OBJECTIVE  Changes noted in objective findings: R distal tib-fib, TC, and subtalar jt hypomobility.     ASSESSMENT/PLAN  Diagnosis: R ankle sprain   STG/LTGs have been met or progress has been made towards goals:  Yes, please see goal flowsheet for most current information  Assessment of Progress: current status is unknown.    Last current status: Pt is progressing as expected   Self Management Plans:  HEP  I have re-evaluated this patient and find that the nature, scope, duration and intensity of the therapy is appropriate for the medical condition of the patient.  Margot continues to require the following intervention to meet STG and LTG's:  HEP.    Recommendations:  Discharge with current home program.  Patient to follow up with MD as needed.    Please refer to the daily flowsheet for treatment today, total treatment time and time spent performing 1:1 timed codes.

## 2022-06-08 ENCOUNTER — THERAPY VISIT (OUTPATIENT)
Dept: PHYSICAL THERAPY | Facility: CLINIC | Age: 27
End: 2022-06-08
Payer: COMMERCIAL

## 2022-06-08 DIAGNOSIS — M25.572 CHRONIC PAIN OF LEFT ANKLE: ICD-10-CM

## 2022-06-08 DIAGNOSIS — G89.29 CHRONIC PAIN OF LEFT ANKLE: ICD-10-CM

## 2022-06-08 PROCEDURE — 97530 THERAPEUTIC ACTIVITIES: CPT | Mod: GP | Performed by: PHYSICAL THERAPIST

## 2022-06-08 PROCEDURE — 97161 PT EVAL LOW COMPLEX 20 MIN: CPT | Mod: GP | Performed by: PHYSICAL THERAPIST

## 2022-06-08 PROCEDURE — 97110 THERAPEUTIC EXERCISES: CPT | Mod: GP | Performed by: PHYSICAL THERAPIST

## 2022-06-08 NOTE — PROGRESS NOTES
KEY PT FINDINGS:  1) Restrictions in gastroc/soleus mobility   2) Lacking great toe extension  3) Poor squat mechanics > did not assess SL squat mechanics    Physical Therapy Initial Evaluation: Subjective History     Injury/Condition Details:  Presenting Complaint Left leg tightness   Onset Timing/Date April 2022   Mechanism Noticed a bump in her calf and has a history of blood clots in the family - ultrasound clean for DVT. Has varicose veins.   Is currently training for the [employer].   Does not do a good job for mobility or strength training.   Has been sitting a lot working from home.   Has experienced pain or tension in the calf muscle but not more recently. Things are feeling better.   Has been recommended to try compression socks.     Current race: Grandma's in 10 days.     Left hamstring feels tension occasionally, most of it has gone away. It is intermittent.     Does not do strength training, previously did yoga.   Does foam rolling 3-4x/week  Does not do cross training.   Health Goal: be decently healthy to keep running consistently.   (miles/week: 20-40 miles weeks)   Runs in [facility]      Symptom Behavior Details    Primary Symptoms Sporadic symptoms; Activity/position dependent, pain (Location: Left leg tightness in the calf, middle hamstring, Quality: Aching/Throbbing), stiffness   Worst Pain 1/10 (with general standing)   Symptom Provocators Running   Best Pain 0/10    Symptom Relievers Stretching   Time of day dependent? No   Recent symptom change? symptoms improving     Prior Testing/Intervention for current condition:  Prior Tests  Ultrasound   Prior Treatment none     Lifestyle & General Medical History:  Employment Fundraising,  organization   Usual physical activities  (within past year) See above   Orthopaedic history See Epic Chart   Notable medical history See Epic Chart   Patient Reported Health good       Lower Extremity Physical Therapy Examination    Dynamic  Movement Screen:  2 leg stance: equal weight bearing, appropriate arch height  2 leg squat: femoral collapse due to lack of ankle DF    1 leg stance: Right: Fair proprio; Left: Fair proprio  1 leg squat:   Right: Deferred  Left: Deferred    Gait: Normal, non-antalgic           Hip Joint ROM: deferred    Lower Extremity Muscle Strength (x/5): deferred    Basic Muscle Activation:  Transversus Abdominus: Fair    Lower Extremity Flexibility Screen:  Hamstrings (Supine SLR): Right: ++; Left: ++  Gastroc (Supine Active DF): Right: ++; Left: ++ (CKC DF: 8.0 L, 8.5 R)  Quadriceps (Prone KF): Right: +; Left: +  Hip Flexors (Nate Test): Right: +; Left: +  ITB (Karthikeyan Test): Right: +; Left: +        Assessment/Plan:  Patient is a 26 year old female with left side hip and left side ankle complaints.    Patient has the following significant findings with corresponding treatment plan.                Diagnosis 1:  Left leg tightness  Pain -  hot/cold therapy, manual therapy, self management and education  Decreased ROM/flexibility - manual therapy, therapeutic exercise and home program  Decreased joint mobility - manual therapy, therapeutic exercise and home program  Impaired muscle performance - neuro re-education and home program  Decreased function - therapeutic activities and home program    Therapy Evaluation Codes:   1) History comprised of:   Personal factors that impact the plan of care:      None.    Comorbidity factors that impact the plan of care are:      None.     Medications impacting care: None.  2) Examination of Body Systems comprised of:   Body structures and functions that impact the plan of care:      Ankle and Hip.   Activity limitations that impact the plan of care are:      Running.  3) Clinical presentation characteristics are:   Stable/Uncomplicated.  4) Decision-Making    Low complexity using standardized patient assessment instrument and/or measureable assessment of functional outcome.  Cumulative  Therapy Evaluation is: Low complexity.    Previous and current functional limitations:  (See Goal Flow Sheet for this information)    Short term and Long term goals: (See Goal Flow Sheet for this information)     Communication ability:  Patient appears to be able to clearly communicate and understand verbal and written communication and follow directions correctly.  Treatment Explanation - The following has been discussed with the patient:   RX ordered/plan of care  Anticipated outcomes  Possible risks and side effects  This patient would benefit from PT intervention to resume normal activities.   Rehab potential is good.    Frequency:  1 X week, once daily  Duration:  for 6 weeks  Discharge Plan:  Achieve all LTG.  Independent in home treatment program.  Reach maximal therapeutic benefit.    Please refer to the daily flowsheet for treatment today, total treatment time and time spent performing 1:1 timed codes.

## 2022-07-26 NOTE — PROGRESS NOTES
"Chief Complaint   Patient presents with     Derm Problem     Subjective     Margot Menezes is a 23 year old female who presents to clinic today for the following health issues:    HPI   Cyst Right Hand      Duration: July 1, 2019    Description (location/character/radiation): right hand palm, can be sensitive at times but denies pain.      Intensity:  mild    Accompanying signs and symptoms: none    History (similar episodes/previous evaluation): cyst on her right top of her foot 2006    Precipitating or alleviating factors: driving for long periods of time holding on the steering wheel    Therapies tried and outcome: None   Recently moved apartments and was doing more lifting than normal      PMH factor V Leiden.    Reports she is stable and does not have any problems related to this.    Has not seen a hematologist in years and would like to establish with one here in the [facility] after moving here from the [facility].      Reviewed and updated as needed this visit by Provider  Tobacco  Allergies  Meds  Problems  Med Hx  Surg Hx  Fam Hx         Review of Systems   ROS COMP: Constitutional, HEENT, cardiovascular, pulmonary, gi and gu systems are negative, except as otherwise noted.      Objective    BP 90/62 (BP Location: Right arm, Patient Position: Sitting, Cuff Size: Adult Large)   Pulse 53   Temp 97.9  F (36.6  C) (Oral)   Ht 1.695 m (5' 6.75\")   Wt 70.3 kg (155 lb)   LMP 07/20/2019 (Exact Date)   SpO2 98%   Breastfeeding? No   BMI 24.46 kg/m    Body mass index is 24.46 kg/m .  Physical Exam   GENERAL: healthy, alert and no distress  MS: normal range of motion, no cyanosis, clubbing, or edema, no varicosities, no edema, peripheral pulses normal and tenderness to palpation palmar aspect of right hand over the distal MC/P joint with 2 mm firm mobile subcutaneous cyst.    SKIN: no suspicious lesions or rashes  PSYCH: mentation appears normal, affect normal/bright          Assessment & Plan       " ICD-10-CM    1. Factor 5 Leiden mutation, heterozygous (H) D68.51 ONC/HEME ADULT REFERRAL   2. Ganglion cyst M67.40       discussed options for ganglion including watch and wait or go to ortho.  Pt prefers to watch and wait.   Will monitor for increasing size and f/u as needed.      Referred to hematology to establish.  Questions about birth control options with Factor V Leiden. Discussed most OCP are not a good option.  Will defer to hematology.       See Patient Instructions    Return in about 1 week (around 8/20/2019), or if symptoms worsen or fail to improve.    YULIYA Snow CNP  [facility]         no

## 2022-09-18 ENCOUNTER — HEALTH MAINTENANCE LETTER (OUTPATIENT)
Age: 27
End: 2022-09-18

## 2023-01-29 ENCOUNTER — HEALTH MAINTENANCE LETTER (OUTPATIENT)
Age: 28
End: 2023-01-29

## 2023-06-29 ENCOUNTER — OFFICE VISIT (OUTPATIENT)
Dept: FAMILY MEDICINE | Facility: CLINIC | Age: 28
End: 2023-06-29
Payer: COMMERCIAL

## 2023-06-29 VITALS
BODY MASS INDEX: 24.66 KG/M2 | WEIGHT: 157.12 LBS | DIASTOLIC BLOOD PRESSURE: 80 MMHG | HEIGHT: 67 IN | SYSTOLIC BLOOD PRESSURE: 118 MMHG | HEART RATE: 60 BPM | OXYGEN SATURATION: 99 % | TEMPERATURE: 98 F

## 2023-06-29 DIAGNOSIS — Z00.00 ROUTINE GENERAL MEDICAL EXAMINATION AT A HEALTH CARE FACILITY: Primary | ICD-10-CM

## 2023-06-29 DIAGNOSIS — Z11.4 SCREENING FOR HIV (HUMAN IMMUNODEFICIENCY VIRUS): ICD-10-CM

## 2023-06-29 DIAGNOSIS — M25.551 HIP PAIN, RIGHT: ICD-10-CM

## 2023-06-29 DIAGNOSIS — L70.0 ACNE VULGARIS: ICD-10-CM

## 2023-06-29 DIAGNOSIS — Z13.220 SCREENING FOR LIPID DISORDERS: ICD-10-CM

## 2023-06-29 DIAGNOSIS — Z11.59 NEED FOR HEPATITIS C SCREENING TEST: ICD-10-CM

## 2023-06-29 DIAGNOSIS — Z12.4 SCREENING FOR CERVICAL CANCER: ICD-10-CM

## 2023-06-29 DIAGNOSIS — D68.51 FACTOR 5 LEIDEN MUTATION, HETEROZYGOUS (H): ICD-10-CM

## 2023-06-29 PROBLEM — G89.29 CHRONIC PAIN OF LEFT ANKLE: Status: RESOLVED | Noted: 2022-06-08 | Resolved: 2023-06-29

## 2023-06-29 PROBLEM — M67.40 GANGLION CYST: Status: RESOLVED | Noted: 2019-08-13 | Resolved: 2023-06-29

## 2023-06-29 PROBLEM — I83.812 VARICOSE VEINS OF LEFT LOWER EXTREMITY WITH PAIN: Status: ACTIVE | Noted: 2022-04-26

## 2023-06-29 PROBLEM — S93.401A SPRAIN OF RIGHT ANKLE: Status: RESOLVED | Noted: 2021-02-19 | Resolved: 2023-06-29

## 2023-06-29 PROBLEM — M25.572 CHRONIC PAIN OF LEFT ANKLE: Status: RESOLVED | Noted: 2022-06-08 | Resolved: 2023-06-29

## 2023-06-29 PROBLEM — I83.813 VARICOSE VEINS OF BOTH LOWER EXTREMITIES WITH PAIN: Status: ACTIVE | Noted: 2022-04-26

## 2023-06-29 PROCEDURE — 87389 HIV-1 AG W/HIV-1&-2 AB AG IA: CPT | Performed by: FAMILY MEDICINE

## 2023-06-29 PROCEDURE — 86803 HEPATITIS C AB TEST: CPT | Performed by: FAMILY MEDICINE

## 2023-06-29 PROCEDURE — 80061 LIPID PANEL: CPT | Performed by: FAMILY MEDICINE

## 2023-06-29 PROCEDURE — G0145 SCR C/V CYTO,THINLAYER,RESCR: HCPCS | Performed by: FAMILY MEDICINE

## 2023-06-29 PROCEDURE — 80048 BASIC METABOLIC PNL TOTAL CA: CPT | Performed by: FAMILY MEDICINE

## 2023-06-29 RX ORDER — CETIRIZINE HYDROCHLORIDE 10 MG/1
10 TABLET ORAL DAILY
COMMUNITY

## 2023-06-29 RX ORDER — ADAPALENE GEL USP, 0.3% 3 MG/G
GEL TOPICAL AT BEDTIME
Qty: 45 G | Refills: 1 | Status: SHIPPED | OUTPATIENT
Start: 2023-06-29 | End: 2023-12-19

## 2023-06-29 RX ORDER — MULTIVITAMIN/IRON/FOLIC ACID 18MG-0.4MG
1 TABLET ORAL DAILY
COMMUNITY

## 2023-06-29 ASSESSMENT — ENCOUNTER SYMPTOMS
HEMATURIA: 0
HEADACHES: 0
PALPITATIONS: 0
FEVER: 0
ARTHRALGIAS: 0
NERVOUS/ANXIOUS: 0
SHORTNESS OF BREATH: 0
ABDOMINAL PAIN: 0
SORE THROAT: 0
DYSURIA: 0
PARESTHESIAS: 1
MYALGIAS: 1
DIZZINESS: 0
FREQUENCY: 0
EYE PAIN: 0
CONSTIPATION: 0
NAUSEA: 0
DIARRHEA: 0
HEARTBURN: 0
CHILLS: 0
JOINT SWELLING: 0
BREAST MASS: 0
WEAKNESS: 0
HEMATOCHEZIA: 0
COUGH: 0

## 2023-06-29 NOTE — NURSING NOTE
"27 year old  Chief Complaint   Patient presents with     New Patient     Physical     Pt has some concern of sun burns and mole. Muscle flare up in right hip pain, pt is a marathon runner. Pt had a fall a year ago and her right elbow is still bothering her. Varicose veins       Blood pressure 118/80, pulse 60, temperature 98  F (36.7  C), height 1.691 m (5' 6.58\"), weight 71.3 kg (157 lb 1.9 oz), SpO2 99 %, not currently breastfeeding. Body mass index is 24.92 kg/m .  Patient Active Problem List   Diagnosis     Factor 5 Leiden mutation, heterozygous (H)     Ganglion cyst     Sprain of right ankle     Chronic pain of left ankle     Varicose veins of left lower extremity with pain       Wt Readings from Last 2 Encounters:   06/29/23 71.3 kg (157 lb 1.9 oz)   04/23/21 70.3 kg (155 lb)     BP Readings from Last 3 Encounters:   06/29/23 118/80   04/23/21 110/60   02/19/21 126/75         Current Outpatient Medications   Medication     adapalene (DIFFERIN) 0.3 % external gel     cholecalciferol 50 MCG (2000 UT) CAPS     levonorgestrel (MIRENA) 52 MG (20 mcg/day) IUD     multivitamin w/minerals (CENTRUM ADULTS) tablet     No current facility-administered medications for this visit.       Social History     Tobacco Use     Smoking status: Never     Smokeless tobacco: Never   Substance Use Topics     Alcohol use: Yes     Comment: 2-3 drinks a week     Drug use: Never       Health Maintenance Due   Topic Date Due     ADVANCE CARE PLANNING  Never done     HIV SCREENING  Never done     HEPATITIS C SCREENING  Never done     PAP  07/30/2023       Lab Results   Component Value Date    PAP NIL 07/30/2020 June 29, 2023 1:06 PM  "

## 2023-06-29 NOTE — PROGRESS NOTES
CC: New Patient and Physical (Pt has some concern of sun burns and mole. Muscle flare up in right hip pain, pt is a marathon runner. Pt had a fall a year ago and her right elbow is still bothering her. Varicose veins)      Margot is a 27 year old female who presents to clinic for an annual exam.     New concerns:  Right hip pain -- Diagnosing herself with an overuse injury. Increased her activity level running without proper stretching. Saw PT for IT band issues in 2020/2021 and was told she has weakness in hips and glutes, hasn't been doing the exercises that were advised. Pain in IT band, hamstrings, and hips. Flare-up in [place] on vacation last week. Has been trying to rest for the last week. Can feel some tension in her hips, R > L. Can still feel weakness in her hamstrings. Intense muscle tension in her quads lats week.     Severe sunburn in May 2022 -- Has noticed some sunspots on  R shoulder since then.     Running and fell onto her R elbow in August 2022 -- Scar on the right elbow, more sensitive to touch. Full ROM of elbow.      Chronic health conditions:  Patient Active Problem List    Diagnosis Date Noted     Varicose veins of both lower extremities with pain 04/26/2022     Priority: Medium     Stretching, occasional compression socks       Factor 5 Leiden mutation, heterozygous (H) 08/13/2019     Priority: Medium     Dad had a blood clot, so then Margot was tested in 2013. Saw hematology for evaluation.         We reviewed and updated her medication list as necessary. Her past medical and surgical history as well as her family history were reviewed and updated as necessary.    Gynecological history:  Menstrual/PMS/menopausal symptoms: occasional spotting on Mirena  Last Pap:  7/30/2020, result Normal  History of abnormal Paps: no  Concern for STIs: no  Safety: Feels safe in relationship  Contraceptive method: Mirena IUD since August 2020      Other health-related habits:  Nutrition: Varied &  "balanced  Physical activity: Running  Tobacco: None    Alcohol: 2-3 drinks/week  Drug use: no   Vaccines: UTD  Hep C & HIV screening: Due  DM screening: Will collect today  Lipids: Will collect today     XC . Works remotely in [employer].       OBJECTIVE:   /80   Pulse 60   Temp 98  F (36.7  C)   Ht 1.691 m (5' 6.58\")   Wt 71.3 kg (157 lb 1.9 oz)   SpO2 99%   BMI 24.92 kg/m     General: Healthy female in NAD.  Cooperative and pleasant.  HEENT: Normocephalic, atraumatic. Oropharynx moist without lesions or exudate.  TMs normal. Supple neck, without lymphadenopathy or thyromegaly.    Lungs: CTA bilaterally.  CV: RRR, normal S1 and S2, without murmurs, rubs, or gallops appreciated.    Abdomen: Soft, NT, ND.  No masses or hepatosplenomegaly appreciated.    Gyn: Normal appearing external genitalia without lesion.  Vaginal mucosa pink and moist.  Cervix visualized and Pap performed. IUD strings visualized.  Extremities: WWW, without deformity, edema.  Skin: Clear without lesions or rashes. Scattered solar lentigines on right shoulder. Scar on right elbow from prior fall.   Neuro: Grossly intact, nonfocal.  Psych: Mood and affect appropriate.      ASSESSMENT AND PLAN:   Margot was seen today for new patient and physical.  Routine general medical examination at a health care facility  Lipid, BMP, HIV, hep C ordered for screening.   Pap smear collected.     Factor 5 Leiden mutation, heterozygous (H)  No treatment or prophylaxis needed at this time.     Hip pain, right  Referral to PT for R hip pain in marathon runner. Hoping to run [employer] in October.   -     Physical Therapy Referral; Future    Acne vulgaris  The current medical regimen is effective;  continue present plan and medications.  -     adapalene (DIFFERIN) 0.3 % external gel; Apply topically At Bedtime        Return in about 1 year (around 6/29/2024) for physical.    Cara White MD  Family Medicine  "

## 2023-06-30 LAB
ANION GAP SERPL CALCULATED.3IONS-SCNC: 11 MMOL/L (ref 7–15)
BUN SERPL-MCNC: 9.5 MG/DL (ref 6–20)
CALCIUM SERPL-MCNC: 9.6 MG/DL (ref 8.6–10)
CHLORIDE SERPL-SCNC: 102 MMOL/L (ref 98–107)
CHOLEST SERPL-MCNC: 177 MG/DL
CREAT SERPL-MCNC: 0.62 MG/DL (ref 0.51–0.95)
DEPRECATED HCO3 PLAS-SCNC: 25 MMOL/L (ref 22–29)
GFR SERPL CREATININE-BSD FRML MDRD: >90 ML/MIN/1.73M2
GLUCOSE SERPL-MCNC: 88 MG/DL (ref 70–99)
HCV AB SERPL QL IA: NONREACTIVE
HDLC SERPL-MCNC: 54 MG/DL
HIV 1+2 AB+HIV1 P24 AG SERPL QL IA: NONREACTIVE
LDLC SERPL CALC-MCNC: 104 MG/DL
NONHDLC SERPL-MCNC: 123 MG/DL
POTASSIUM SERPL-SCNC: 3.9 MMOL/L (ref 3.4–5.3)
SODIUM SERPL-SCNC: 138 MMOL/L (ref 136–145)
TRIGL SERPL-MCNC: 95 MG/DL

## 2023-07-05 LAB
BKR LAB AP GYN ADEQUACY: NORMAL
BKR LAB AP GYN INTERPRETATION: NORMAL
BKR LAB AP HPV REFLEX: NORMAL
BKR LAB AP PREVIOUS ABNORMAL: NORMAL
PATH REPORT.COMMENTS IMP SPEC: NORMAL
PATH REPORT.COMMENTS IMP SPEC: NORMAL
PATH REPORT.RELEVANT HX SPEC: NORMAL

## 2023-07-07 ENCOUNTER — THERAPY VISIT (OUTPATIENT)
Dept: PHYSICAL THERAPY | Facility: CLINIC | Age: 28
End: 2023-07-07
Attending: FAMILY MEDICINE
Payer: COMMERCIAL

## 2023-07-07 DIAGNOSIS — M25.551 HIP PAIN, RIGHT: ICD-10-CM

## 2023-07-07 PROCEDURE — 97530 THERAPEUTIC ACTIVITIES: CPT | Mod: GP | Performed by: PHYSICAL THERAPIST

## 2023-07-07 PROCEDURE — 97110 THERAPEUTIC EXERCISES: CPT | Mod: GP | Performed by: PHYSICAL THERAPIST

## 2023-07-07 PROCEDURE — 97161 PT EVAL LOW COMPLEX 20 MIN: CPT | Mod: GP | Performed by: PHYSICAL THERAPIST

## 2023-07-07 NOTE — PROGRESS NOTES
"PHYSICAL THERAPY EVALUATION  Type of Visit: Evaluation    See electronic medical record for Abuse and Falls Screening details.    Subjective         KEY PT FINDINGS:  1) Limited hip internal rotation on the right  2) Proximal hip strength deficits  3) Poor single leg squat mechanics    Physical Therapy Initial Evaluation: Subjective History     Injury/Condition Details:  Presenting Complaint Right hip pain   Onset Timing/Date May 2023   Mechanism \"I'm a distance runner\" and is weak in the hips and arleen.   I have an overuse injury and a hip flexor strain.   Would like to train for [employer].   Started and jumped into a couple long runs in April and May (11-12 miles and then a back to back 10 to 12 miles) and felt like she was running too fast and dehydrated and didn't stretch.     Had a flare-up in Mid-may. Took time off (1 week) and did a lot of foam rolling, stretching, massage. Went hiking over memorial day weekend.   Has been able to resume running and went on another hiking trip and was able to do it without pain.   Jumped back into running.   Went to [place] for 2-3 weeks and did lots of walking and had a \"big flare-up\" while on a guided tour. Did a run a few days after the flare-up and got even more tight and then her hamstrings got tight too.   Ran 1 week ago and has been steadily increasing mileage since then and the hip has been sore and tight.   Goal: learn to recover, be a healthy runner.     No cross training.   No strength training.   Does yoga occasionally.      Symptom Behavior Details    Primary Symptoms Sporadic symptoms; Activity/position dependent, pain (Location: anterior right hip, over front muscle, occasional IT band tension, has had quad pain, hamstring tension, Quality: Aching/Throbbing), stiffness   Worst Pain 5-6/10 (with see below)   Symptom Provocators Running: sx come on after a run, can be either immediately after a run or the next day  Sitting     No sleep disturbances.   No trouble  "   Best Pain 0/10    Symptom Relievers Ibuprofen    Time of day dependent? No   Recent symptom change? no change in symptoms     Prior Testing/Intervention for current condition:  Prior Tests None   Prior Treatment PT (has not maintained exercises)      Lifestyle & General Medical History:  Employment [employer]    Usual physical activities  (within past year) Does group runs often  nearly daily runs (running 4-6 days per week when healthy)   Orthopaedic history See Epic Chart   Notable medical history See Epic Chart   Patient Reported Health good          Objective    Lower Extremity Physical Therapy Examination    Dynamic Movement Screen:  2 leg stance: neutral appearing alignment  2 leg squat: Maintains narrow base of support, left toes leave ground, right foot moves into ER    1 leg stance: Right: Proprio deficit; Left: Normal  1 leg squat:   Right: Poor mechanics with lateral hip shift, femoral IR/add, maintains near upright trunk   Left: Lateral hip shift, femoral IR/add, maintains near upright trunk    Gait: Stiff (questionable if relaxed walking pattern)              Hip Joint ROM   Flex Ext ER IR ABD ADD   Right WNL deg - deg 58 deg 43 deg >53 deg post MT WNL deg WNL deg   Left WNL deg - deg 57 deg 55 deg WNL deg WNL deg     Lower Extremity Muscle Strength (x/5)   Hip IR Hip ER Knee Ext Hip ABD Hip Ext Knee Flex   Right  4-/5 4-/5 5/5 4-/5 4-/5 4/5   Left 4/5 4/5 5/5 4/5 4/5 4/5     Basic Muscle Activation:  Transversus Abdominus: Fair contraction, lacks lumbopelvic control with proximal hip testing    Lower Extremity Flexibility Screen:  Hamstrings (Supine SLR): Right: -; Left: -  Gastroc (Supine Active DF): Right: +; Left: +  Quadriceps (Prone KF): Right: +; Left: +  Hip Flexors (Nate Test): Right: -; Left: -  ITB (Karthikeyan Test): Right: -; Left: -    Hip Special Testing:  Test Right Left   FADDIR (-) (-)   BERNARDINO (+) Tightness, not pain (-)   Log Roll (ER) (-) (-)   Resisted SLR (-) (-)     Resisted Movement  Testing:  Test Right Left   Adduction Squeeze (-) (-)   Psoas (-) (-)   TFL (-) (-)   ABD (-) (-)     Hip Palpation:  Tender to palpation at: TFL muscle belly          Assessment & Plan   CLINICAL IMPRESSIONS   Medical Diagnosis: Right hip pain    Treatment Diagnosis: Right hip pain   Impression/Assessment: Patient is a 27 year old female with right hip complaints.  The following significant findings have been identified: Pain, Decreased ROM/flexibility, Decreased joint mobility, Decreased strength, Decreased proprioception, Impaired muscle performance and Decreased activity tolerance. These impairments interfere with their ability to perform self care tasks, work tasks, recreational activities and household chores as compared to previous level of function.     Clinical Decision Making (Complexity):   Clinical Presentation: Stable/Uncomplicated  Clinical Presentation Rationale: based on medical and personal factors listed in PT evaluation  Clinical Decision Making (Complexity): Low complexity    PLAN OF CARE  Treatment Interventions:  Interventions: Gait Training, Manual Therapy, Neuromuscular Re-education, Therapeutic Activity, Therapeutic Exercise    Long Term Goals     PT Goal 1  Goal Identifier: Return to Running  Goal Description: Patient to return to running 4x/week with increasing mileage and no increase in hip sx  Rationale: to maximize safety and independence within the community  Target Date: 09/22/23      Frequency of Treatment: 1x/week  Duration of Treatment: 3 weeks then 2x/month for 1 month then 1x/month for 1 month    Recommended Referrals to Other Professionals: Physical Therapy  Education Assessment:   Learner/Method: Patient;No Barriers to Learning    Risks and benefits of evaluation/treatment have been explained.   Patient/Family/caregiver agrees with Plan of Care.     Evaluation Time:     PT Eval, Low Complexity Minutes (29367): 20    Signing Clinician: Diana Melendez PT

## 2023-07-10 ASSESSMENT — ACTIVITIES OF DAILY LIVING (ADL)
HOS_ADL_SCORE(%): 76.56
GETTING_INTO_AND_OUT_OF_A_BATHTUB: NO DIFFICULTY AT ALL
GOING_UP_1_FLIGHT_OF_STAIRS: SLIGHT DIFFICULTY
HOS_ADL_COUNT: 16
HOS_ADL_ITEM_SCORE_TOTAL: 49
HOS_ADL_HIGHEST_POTENTIAL_SCORE: 64
STEPPING_UP_AND_DOWN_CURBS: SLIGHT DIFFICULTY
HOW_WOULD_YOU_RATE_YOUR_CURRENT_LEVEL_OF_FUNCTION_DURING_YOUR_USUAL_ACTIVITIES_OF_DAILY_LIVING_FROM_0_TO_100_WITH_100_BEING_YOUR_LEVEL_OF_FUNCTION_PRIOR_TO_YOUR_HIP_PROBLEM_AND_0_BEING_THE_INABILITY_TO_PERFORM_ANY_OF_YOUR_USUAL_DAILY_ACTIVITIES?: 75
GETTING_INTO_AND_OUT_OF_AN_AVERAGE_CAR: NO DIFFICULTY AT ALL
LIGHT_TO_MODERATE_WORK: SLIGHT DIFFICULTY
GOING_DOWN_1_FLIGHT_OF_STAIRS: SLIGHT DIFFICULTY
STANDING_FOR_15_MINUTES: SLIGHT DIFFICULTY
RECREATIONAL_ACTIVITIES: MODERATE DIFFICULTY
TWISTING/PIVOTING_ON_INVOLVED_LEG: SLIGHT DIFFICULTY
WALKING_INITIALLY: NO DIFFICULTY AT ALL
PUTTING_ON_SOCKS_AND_SHOES: NO DIFFICULTY AT ALL
WALKING_UP_STEEP_HILLS: SLIGHT DIFFICULTY
ROLLING_OVER_IN_BED: NO DIFFICULTY AT ALL
WALKING_DOWN_STEEP_HILLS: MODERATE DIFFICULTY
WALKING_APPROXIMATELY_10_MINUTES: SLIGHT DIFFICULTY
WALKING_15_MINUTES_OR_GREATER: SLIGHT DIFFICULTY
DEEP_SQUATTING: MODERATE DIFFICULTY
SITTING_FOR_15_MINUTES: SLIGHT DIFFICULTY

## 2023-07-18 ENCOUNTER — THERAPY VISIT (OUTPATIENT)
Dept: PHYSICAL THERAPY | Facility: CLINIC | Age: 28
End: 2023-07-18
Payer: COMMERCIAL

## 2023-07-18 DIAGNOSIS — M25.551 HIP PAIN, RIGHT: Primary | ICD-10-CM

## 2023-07-18 PROCEDURE — 97110 THERAPEUTIC EXERCISES: CPT | Mod: GP | Performed by: PHYSICAL THERAPIST

## 2023-07-18 PROCEDURE — 97140 MANUAL THERAPY 1/> REGIONS: CPT | Mod: GP | Performed by: PHYSICAL THERAPIST

## 2023-07-18 PROCEDURE — 97530 THERAPEUTIC ACTIVITIES: CPT | Mod: GP | Performed by: PHYSICAL THERAPIST

## 2023-07-26 ENCOUNTER — THERAPY VISIT (OUTPATIENT)
Dept: PHYSICAL THERAPY | Facility: CLINIC | Age: 28
End: 2023-07-26
Payer: COMMERCIAL

## 2023-07-26 DIAGNOSIS — M25.551 HIP PAIN, RIGHT: Primary | ICD-10-CM

## 2023-07-26 PROCEDURE — 97530 THERAPEUTIC ACTIVITIES: CPT | Mod: GP | Performed by: PHYSICAL THERAPIST

## 2023-07-26 PROCEDURE — 97110 THERAPEUTIC EXERCISES: CPT | Mod: 59 | Performed by: PHYSICAL THERAPIST

## 2023-08-09 ENCOUNTER — THERAPY VISIT (OUTPATIENT)
Dept: PHYSICAL THERAPY | Facility: CLINIC | Age: 28
End: 2023-08-09
Payer: COMMERCIAL

## 2023-08-09 DIAGNOSIS — M25.551 HIP PAIN, RIGHT: Primary | ICD-10-CM

## 2023-08-09 PROCEDURE — 97530 THERAPEUTIC ACTIVITIES: CPT | Mod: GP | Performed by: PHYSICAL THERAPIST

## 2023-08-09 PROCEDURE — 97110 THERAPEUTIC EXERCISES: CPT | Mod: GP | Performed by: PHYSICAL THERAPIST

## 2023-08-22 ENCOUNTER — THERAPY VISIT (OUTPATIENT)
Dept: PHYSICAL THERAPY | Facility: CLINIC | Age: 28
End: 2023-08-22
Payer: COMMERCIAL

## 2023-08-22 DIAGNOSIS — M25.551 HIP PAIN, RIGHT: Primary | ICD-10-CM

## 2023-08-22 PROCEDURE — 97110 THERAPEUTIC EXERCISES: CPT | Mod: 59 | Performed by: PHYSICAL THERAPIST

## 2023-08-22 PROCEDURE — 97530 THERAPEUTIC ACTIVITIES: CPT | Mod: GP | Performed by: PHYSICAL THERAPIST

## 2023-09-14 ENCOUNTER — THERAPY VISIT (OUTPATIENT)
Dept: PHYSICAL THERAPY | Facility: CLINIC | Age: 28
End: 2023-09-14
Payer: COMMERCIAL

## 2023-09-14 DIAGNOSIS — M25.551 HIP PAIN, RIGHT: Primary | ICD-10-CM

## 2023-09-14 PROCEDURE — 97530 THERAPEUTIC ACTIVITIES: CPT | Mod: GP | Performed by: PHYSICAL THERAPIST

## 2023-09-14 PROCEDURE — 97110 THERAPEUTIC EXERCISES: CPT | Mod: GP | Performed by: PHYSICAL THERAPIST

## 2023-12-03 ENCOUNTER — MYC MEDICAL ADVICE (OUTPATIENT)
Dept: FAMILY MEDICINE | Facility: CLINIC | Age: 28
End: 2023-12-03

## 2023-12-14 ENCOUNTER — OFFICE VISIT (OUTPATIENT)
Dept: FAMILY MEDICINE | Facility: CLINIC | Age: 28
End: 2023-12-14
Payer: COMMERCIAL

## 2023-12-14 VITALS
DIASTOLIC BLOOD PRESSURE: 79 MMHG | TEMPERATURE: 97.3 F | WEIGHT: 158 LBS | SYSTOLIC BLOOD PRESSURE: 124 MMHG | BODY MASS INDEX: 25.06 KG/M2 | OXYGEN SATURATION: 100 % | HEART RATE: 52 BPM

## 2023-12-14 DIAGNOSIS — Z86.69 HISTORY OF HORDEOLUM: ICD-10-CM

## 2023-12-14 DIAGNOSIS — G44.209 TENSION HEADACHE: ICD-10-CM

## 2023-12-14 DIAGNOSIS — L70.0 ACNE VULGARIS: ICD-10-CM

## 2023-12-14 DIAGNOSIS — L98.9 SKIN LESION: ICD-10-CM

## 2023-12-14 DIAGNOSIS — R44.8 FEELS COLD: Primary | ICD-10-CM

## 2023-12-14 PROCEDURE — 84443 ASSAY THYROID STIM HORMONE: CPT | Performed by: FAMILY MEDICINE

## 2023-12-14 RX ORDER — MULTIVITAMIN WITH IRON
1 TABLET ORAL DAILY
Qty: 90 TABLET | Refills: 3 | Status: SHIPPED | OUTPATIENT
Start: 2023-12-14

## 2023-12-14 RX ORDER — TRIAMCINOLONE ACETONIDE 1 MG/G
CREAM TOPICAL 2 TIMES DAILY
Qty: 15 G | Refills: 0 | Status: SHIPPED | OUTPATIENT
Start: 2023-12-14 | End: 2023-12-28

## 2023-12-14 RX ORDER — RIBOFLAVIN (VITAMIN B2) 400 MG
400 TABLET ORAL DAILY
Qty: 90 TABLET | Refills: 3 | Status: SHIPPED | OUTPATIENT
Start: 2023-12-14

## 2023-12-14 NOTE — PROGRESS NOTES
CC: Circulation Problems (Pt reports some circulation concerns in her hands and feet.), Eye Problem (RIGHT eye possible stye, just finished a round a antibiotics.), and Derm Problem (Pt reports a [place] kind of rash on her inner thigh, also wants to a spot looked at on her face. )      SUBJECTIVE: Margot is a 28 year old female who comes in with the following concerns:    Circulation concerns. For years. Has to be really careful about wearing socks and gloves when it starts to get cooler (even 50 degrees). People will comment on how cold her hands are. Hands in general feel really sensitive. No change in skin color or swelling of the extremities. She does do winter hiking and runs outside in the winter and feels okay as long as she dresses appropriately. Only 1x had pain with rewarming, when she was out picking out a [employer] tree on a 20 degree day. Otherwise typically doesn't have pain with rewarming. In general, she feels like she's colder than other people and has to dress warmer than others.     Right lower lid stye. Began Nov 9. Saw an eye doctor 2x, once before Thanksgiving and had a steroid injection on Nov 21 and then was prescribed doxycycline for one week as well as antibiotic cream. The stye went down but 2 weeks later went back in and had it removed and drained (last week Dec 5). Finished antibiotic ointment on Dec 12. Wants to make sure the area is healing appropriately.     Rash on inner thigh. Has been present for at least 6 months. Hasn't changed at all. Circular rash. Her mom was concerned it could be ringworm. It's not pruritic. Tried clotrimazole 1x not long enough to notice a difference. No similar rashes on her body. No history of yeast infections.     Skin lesion on face. Had an acne lesion that hasn't really gone away. Has an appt with a dermatologist scheduled. Has been using adapalene gel and it's not getting bigger but it's not going away. This feels like a deeper lesion. Derm appt is  in Jan.     Headaches. Has had a few headaches over the past few days. She thinks it's related to tooth pain and has been to the dentist to get some bite adjustments. Sometimes will get headaches around her period. These headaches felt more mild, can't tell if unilateral or bilateral. Some have lasted for a few hours. No photophobia or phonophobia. No nausea or vomiting. No vision changes. Has taken ibuprofen a few times.       OBJECTIVE:   /79   Pulse 52   Temp 97.3  F (36.3  C)   Wt 71.7 kg (158 lb)   SpO2 100%   BMI 25.06 kg/m    General: Alert and oriented in no acute distress.  Skin: On right cheek, there is an acne nodule without surrounding erythema or inflammation. On the right inner thigh, there is a faint scaly circular lesion about 1.5 cm in diameter.    Eyes: PERRL. EOMI. Mild erythema right lower eye lid.   MSK: Radial and DP pulses 2+ and symmetric, brisk capillary refill in hands and feet, extremities are warm and well perfused.       ASSESSMENT/PLAN:   Margot was seen today for circulation problems, eye problem and derm problem.    Feels cold  Hands and feet are well-perfused with brisk capillary refill and strong pulses, no s/sx of Raynaud's. Will r/o thyroid disease contributing to feeling cold, but otherwise discussed symptomatic treatment and keeping extremities warm.   -     TSH with free T4 reflex    History of hordeolum  Healing appropriately after excision.     Skin lesion  Possible eczema vs tinea -- will trial first triamcinolone for 2 wks, if not improving consider treatment for tinea. She also has a derm appt coming up in Jan.  -     triamcinolone (KENALOG) 0.1 % external cream; Apply topically 2 times daily for 14 days    Acne vulgaris  Acne nodule on cheek for >1 month. Has appt with dermatology - agree with keeping that for evaluation & management.     Tension headache  Tension headaches likely triggered by tooth pain.  integrative medicine handout on headaches provided  and reviewed with patient for headache diary and consider adding in magnesium and riboflavin supplements. I discussed with the patient the risks, benefits, and alternatives to taking this medication as well as common side effects.   -     magnesium 250 MG tablet; Take 1 tablet (250 mg) by mouth daily  -     Riboflavin 400 MG TABS; Take 400 mg by mouth daily      Worrisome signs and symptoms were discussed with Margot and she was instructed to return to the clinic for concerning symptoms or to call with questions. Return if symptoms worsen or fail to improve.    Time note (e4, 30'): The total time (on the date of service) for this service was 30 minutes, including discussion/face-to-face, chart review, interpretation not otherwise reported, documentation, and updating of the computerized record.    Cara White MD  Family Medicine

## 2023-12-14 NOTE — NURSING NOTE
28 year old  Chief Complaint   Patient presents with    Circulation Problems     Pt reports some circulation concerns in her hands and feet.    Eye Problem     RIGHT eye possible stye, just finished a round a antibiotics.    Derm Problem     Pt reports a [place] kind of rash on her inner thigh, also wants to a spot looked at on her face.        Blood pressure 124/79, pulse 52, temperature 97.3  F (36.3  C), weight 71.7 kg (158 lb), SpO2 100%, not currently breastfeeding. Body mass index is 25.06 kg/m .  Patient Active Problem List   Diagnosis    Factor 5 Leiden mutation, heterozygous (H24)    Varicose veins of both lower extremities with pain    Hip pain, right       Wt Readings from Last 2 Encounters:   12/14/23 71.7 kg (158 lb)   06/29/23 71.3 kg (157 lb 1.9 oz)     BP Readings from Last 3 Encounters:   12/14/23 124/79   06/29/23 118/80   04/23/21 110/60         Current Outpatient Medications   Medication    adapalene (DIFFERIN) 0.3 % external gel    cetirizine (ZYRTEC) 10 MG tablet    cholecalciferol 50 MCG (2000 UT) CAPS    levonorgestrel (MIRENA) 52 MG (20 mcg/day) IUD    multivitamin w/minerals (CENTRUM ADULTS) tablet     No current facility-administered medications for this visit.       Social History     Tobacco Use    Smoking status: Never    Smokeless tobacco: Never   Substance Use Topics    Alcohol use: Yes     Comment: 2-3 drinks a week    Drug use: Never       Health Maintenance Due   Topic Date Due    ADVANCE CARE PLANNING  Never done       Lab Results   Component Value Date    PAP NIL 07/30/2020 December 14, 2023 11:17 AM

## 2023-12-15 LAB — TSH SERPL DL<=0.005 MIU/L-ACNC: 2.13 UIU/ML (ref 0.3–4.2)

## 2023-12-19 ENCOUNTER — MYC REFILL (OUTPATIENT)
Dept: FAMILY MEDICINE | Facility: CLINIC | Age: 28
End: 2023-12-19

## 2023-12-19 DIAGNOSIS — L70.0 ACNE VULGARIS: ICD-10-CM

## 2023-12-20 RX ORDER — ADAPALENE GEL USP, 0.3% 3 MG/G
GEL TOPICAL AT BEDTIME
Qty: 45 G | Refills: 1 | Status: SHIPPED | OUTPATIENT
Start: 2023-12-20 | End: 2024-08-13

## 2023-12-20 NOTE — TELEPHONE ENCOUNTER
Medication requested: adapalene (DIFFERIN) 0.3 % external gel   Last office visit: 12/14/23  [facility] appointments: none  Medication last refilled: 6/29/23; 45 g + 1 refill  Last qualifying labs: N/A    Prescription approved per [facility] Refill Protocol.    Shree DAMON, RN  12/20/23 12:57 PM

## 2024-04-01 ENCOUNTER — MYC MEDICAL ADVICE (OUTPATIENT)
Dept: FAMILY MEDICINE | Facility: CLINIC | Age: 29
End: 2024-04-01

## 2024-04-01 DIAGNOSIS — M25.551 HIP PAIN, RIGHT: Primary | ICD-10-CM

## 2024-05-19 ENCOUNTER — MYC MEDICAL ADVICE (OUTPATIENT)
Dept: FAMILY MEDICINE | Facility: CLINIC | Age: 29
End: 2024-05-19

## 2024-05-19 DIAGNOSIS — B37.31 YEAST INFECTION OF THE VAGINA: Primary | ICD-10-CM

## 2024-05-20 RX ORDER — FLUCONAZOLE 150 MG/1
150 TABLET ORAL ONCE
Qty: 1 TABLET | Refills: 0 | Status: SHIPPED | OUTPATIENT
Start: 2024-05-20 | End: 2024-05-20

## 2024-08-11 DIAGNOSIS — L70.0 ACNE VULGARIS: Primary | ICD-10-CM

## 2024-08-13 RX ORDER — ADAPALENE GEL USP, 0.3% 3 MG/G
GEL TOPICAL
Qty: 45 G | Refills: 1 | Status: SHIPPED | OUTPATIENT
Start: 2024-08-13

## 2024-08-13 NOTE — TELEPHONE ENCOUNTER
Medication requested: adapalene (DIFFERIN) 0.3 % external gel   Last office visit: 12/14/23  [facility] appointments: 11/21/24  Medication last refilled: 12/20/23; 45 g + 1 refills  Last qualifying labs: N/A    Prescription approved per [facility] Refill Protocol.    Shree DAMON, RN  08/13/24 9:48 AM

## 2024-09-22 ENCOUNTER — HEALTH MAINTENANCE LETTER (OUTPATIENT)
Age: 29
End: 2024-09-22

## 2024-11-04 SDOH — HEALTH STABILITY: PHYSICAL HEALTH: ON AVERAGE, HOW MANY MINUTES DO YOU ENGAGE IN EXERCISE AT THIS LEVEL?: 40 MIN

## 2024-11-04 SDOH — HEALTH STABILITY: PHYSICAL HEALTH: ON AVERAGE, HOW MANY DAYS PER WEEK DO YOU ENGAGE IN MODERATE TO STRENUOUS EXERCISE (LIKE A BRISK WALK)?: 6 DAYS

## 2024-11-04 ASSESSMENT — SOCIAL DETERMINANTS OF HEALTH (SDOH): HOW OFTEN DO YOU GET TOGETHER WITH FRIENDS OR RELATIVES?: MORE THAN THREE TIMES A WEEK

## 2024-11-06 NOTE — PATIENT INSTRUCTIONS
[facility]        Patient Education   Preventive Care Advice   This is general advice given by our system to help you stay healthy. However, your care team may have specific advice just for you. Please talk to your care team about your preventive care needs.  Nutrition  Eat 5 or more servings of fruits and vegetables each day.  Try wheat bread, brown rice and whole grain pasta (instead of white bread, rice, and pasta).  Get enough calcium and vitamin D. Check the label on foods and aim for 100% of the RDA (recommended daily allowance).  Lifestyle  Exercise at least 150 minutes each week  (30 minutes a day, 5 days a week).  Do muscle strengthening activities 2 days a week. These help control your weight and prevent disease.  No smoking.  Wear sunscreen to prevent skin cancer.  Have a dental exam and cleaning every 6 months.  Yearly exams  See your health care team every year to talk about:  Any changes in your health.  Any medicines your care team has prescribed.  Preventive care, family planning, and ways to prevent chronic diseases.  Shots (vaccines)   HPV shots (up to age 26), if you've never had them before.  Hepatitis B shots (up to age 59), if you've never had them before.  COVID-19 shot: Get this shot when it's due.  Flu shot: Get a flu shot every year.  Tetanus shot: Get a tetanus shot every 10 years.  Pneumococcal, hepatitis A, and RSV shots: Ask your care team if you need these based on your risk.  Shingles shot (for age 50 and up)  General health tests  Diabetes screening:  Starting at age 35, Get screened for diabetes at least every 3 years.  If you are younger than age 35, ask your care team if you should be screened for diabetes.  Cholesterol test: At age 39, start having a cholesterol test every 5 years, or more often if advised.  Bone density scan (DEXA): At age 50, ask your care team if you should have this scan for osteoporosis (brittle bones).  Hepatitis C: Get tested at  least once in your life.  STIs (sexually transmitted infections)  Before age 24: Ask your care team if you should be screened for STIs.  After age 24: Get screened for STIs if you're at risk. You are at risk for STIs (including HIV) if:  You are sexually active with more than one person.  You don't use condoms every time.  You or a partner was diagnosed with a sexually transmitted infection.  If you are at risk for HIV, ask about PrEP medicine to prevent HIV.  Get tested for HIV at least once in your life, whether you are at risk for HIV or not.  Cancer screening tests  Cervical cancer screening: If you have a cervix, begin getting regular cervical cancer screening tests starting at age 21.  Breast cancer scan (mammogram): If you've ever had breasts, begin having regular mammograms starting at age 40. This is a scan to check for breast cancer.  Colon cancer screening: It is important to start screening for colon cancer at age 45.  Have a colonoscopy test every 10 years (or more often if you're at risk) Or, ask your provider about stool tests like a FIT test every year or Cologuard test every 3 years.  To learn more about your testing options, visit:   .  For help making a decision, visit:   https://bit.ly/hp01669.  Prostate cancer screening test: If you have a prostate, ask your care team if a prostate cancer screening test (PSA) at age 55 is right for you.  Lung cancer screening: If you are a current or former smoker ages 50 to 80, ask your care team if ongoing lung cancer screenings are right for you.  For informational purposes only. Not to replace the advice of your health care provider. Copyright   2023 [employer]. All rights reserved. Clinically reviewed by the [facility] Transitions Program. [employer] [id number] - REV 01/24.

## 2024-11-06 NOTE — PROGRESS NOTES
"Preventive Care Visit  [facility]  Cara White MD, Family Medicine  2024      Assessment & Plan     Routine general medical examination at a health care facility  UTD on labs, vaccines, and pap smear.     Acne vulgaris  The current medical regimen is effective;  continue present plan and medications.  - adapalene (DIFFERIN) 0.3 % external gel; APPLY TO AFFECTED AREA EVERY DAY AT BEDTIME    Right hip pain  Dx with R hip tendinosis. Is a runner and would like to complete a distance race next year. PT referral placed for continued management.   - Physical Therapy  Referral; Future      BMI  Estimated body mass index is 25.64 kg/m  as calculated from the following:    Height as of this encounter: 1.693 m (5' 6.67\").    Weight as of this encounter: 73.5 kg (162 lb 1.3 oz).   Weight management plan: Discussed healthy diet and exercise guidelines    Counseling  Appropriate preventive services were addressed with this patient via screening, questionnaire, or discussion as appropriate for fall prevention, nutrition, physical activity, Tobacco-use cessation, social engagement, weight loss and cognition.  Checklist reviewing preventive services available has been given to the patient.  Reviewed patient's diet, addressing concerns and/or questions.       Return in about 53 weeks (around 2025) for Annual Wellness Visit.    Lisa Frost is a 29 year old, presenting for the following:  Physical (Pt wants to discuss weight gain.)         HPI    Gained 5-7 lbs in the last 3 months. Attributes to stress. Lost her job, grandmother , applying to grad school. Continues to run regularly for exercise. Has seen PT in the past for R hip pain, was diagnosed with tendinosis. Continues to do home PT exercises. Goal is to run a distance race next year. Thinks she might benefit from resuming formal PT. Has previously been going to [facility].     Wt Readings from Last 10 Encounters:   24 " 73.5 kg (162 lb 1.3 oz)   12/14/23 71.7 kg (158 lb)   06/29/23 71.3 kg (157 lb 1.9 oz)   04/23/21 70.3 kg (155 lb)   02/19/21 69.4 kg (153 lb)   08/21/20 67.1 kg (148 lb)   07/30/20 66.7 kg (147 lb)   10/24/19 70.4 kg (155 lb 3.2 oz)   08/13/19 70.3 kg (155 lb)     Was seeing a dentist who speculated she had TMJ. Notices headaches more when she clenches. Recommended mouthguard at some point.     Traveled to [place] this summer for 10 days with her partner (he is dual citizen and originally from [place]).         11/4/2024   General Health   How would you rate your overall physical health? Excellent   Feel stress (tense, anxious, or unable to sleep) To some extent      (!) STRESS CONCERN      11/4/2024   Nutrition   Three or more servings of calcium each day? Yes   Diet: Other   If other, please elaborate: Pescatarian, minimal lactose (lactose sensitivity)   How many servings of fruit and vegetables per day? 4 or more   How many sweetened beverages each day? (!) 2            11/4/2024   Exercise   Days per week of moderate/strenous exercise 6 days   Average minutes spent exercising at this level 40 min            11/4/2024   Social Factors   Frequency of gathering with friends or relatives More than three times a week   Worry food won't last until get money to buy more No   Food not last or not have enough money for food? No   Do you have housing? (Housing is defined as stable permanent housing and does not include staying ouside in a car, in a tent, in an abandoned building, in an overnight shelter, or couch-surfing.) Yes   Are you worried about losing your housing? No   Lack of transportation? No   Unable to get utilities (heat,electricity)? No            11/4/2024   Dental   Dentist two times every year? Yes            11/4/2024   TB Screening   Were you born outside of the [place]? No          Today's PHQ-2 Score:       11/7/2024     1:24 PM   PHQ-2 ( 1999 [employer])   Q1: Little interest or pleasure in doing things 0   "  Q2: Feeling down, depressed or hopeless 1    PHQ-2 Score 1    Q1: Little interest or pleasure in doing things Not at all   Q2: Feeling down, depressed or hopeless Several days   PHQ-2 Score 1       Patient-reported         11/4/2024   Substance Use   Alcohol more than 3/day or more than 7/wk No   Do you use any other substances recreationally? No        Social History     Tobacco Use    Smoking status: Never    Smokeless tobacco: Never   Substance Use Topics    Alcohol use: Yes     Comment: 3-4 drinks a week    Drug use: Never          Mammogram Screening - Patient under 40 years of age: Routine Mammogram Screening not recommended.         11/4/2024   STI Screening   New sexual partner(s) since last STI/HIV test? No        History of abnormal Pap smear: No - age 30- 64 PAP with HPV every 5 years recommended        6/29/2023     2:13 PM 7/30/2020     8:21 AM   PAP / HPV   PAP Negative for Intraepithelial Lesion or Malignancy (NILM)     PAP (Historical)  NIL            11/4/2024   Contraception/Family Planning   Questions about contraception or family planning No - Mirena IUD since August 2020           Reviewed and updated as needed this visit by Provider     Meds  Problems  Med Hx  Surg Hx  Fam Hx               Objective    Exam  /84   Pulse (!) 46   Temp 97.3  F (36.3  C)   Ht 1.693 m (5' 6.67\")   Wt 73.5 kg (162 lb 1.3 oz)   SpO2 100%   BMI 25.64 kg/m     Estimated body mass index is 25.64 kg/m  as calculated from the following:    Height as of this encounter: 1.693 m (5' 6.67\").    Weight as of this encounter: 73.5 kg (162 lb 1.3 oz).    Physical Exam  GENERAL: alert and no distress  EYES: Eyes grossly normal to inspection, PERRL and conjunctivae and sclerae normal  HENT: ear canals and TM's normal, nose and mouth without ulcers or lesions  NECK: no adenopathy, no asymmetry, masses, or scars  RESP: lungs clear to auscultation - no rales, rhonchi or wheezes  CV: bradycardia, normal S1 S2, no S3 " or S4, no murmur, click or rub, peripheral pulses strong, and no peripheral edema  ABDOMEN: soft, nontender, without hepatosplenomegaly or masses  MS: no gross musculoskeletal defects noted, no edema  SKIN: no suspicious lesions or rashes  NEURO: Normal strength and tone, mentation intact and speech normal  PSYCH: mentation appears normal, affect normal/bright        Signed Electronically by: Cara White MD

## 2024-11-07 ENCOUNTER — OFFICE VISIT (OUTPATIENT)
Dept: FAMILY MEDICINE | Facility: CLINIC | Age: 29
End: 2024-11-07
Payer: COMMERCIAL

## 2024-11-07 VITALS
BODY MASS INDEX: 25.44 KG/M2 | WEIGHT: 162.08 LBS | HEART RATE: 46 BPM | SYSTOLIC BLOOD PRESSURE: 122 MMHG | TEMPERATURE: 97.3 F | DIASTOLIC BLOOD PRESSURE: 84 MMHG | OXYGEN SATURATION: 100 % | HEIGHT: 67 IN

## 2024-11-07 DIAGNOSIS — M25.551 RIGHT HIP PAIN: ICD-10-CM

## 2024-11-07 DIAGNOSIS — L70.0 ACNE VULGARIS: ICD-10-CM

## 2024-11-07 DIAGNOSIS — Z00.00 ROUTINE GENERAL MEDICAL EXAMINATION AT A HEALTH CARE FACILITY: Primary | ICD-10-CM

## 2024-11-07 RX ORDER — ADAPALENE GEL USP, 0.3% 3 MG/G
GEL TOPICAL
Qty: 45 G | Refills: 1 | Status: SHIPPED | OUTPATIENT
Start: 2024-11-07

## 2024-12-11 DIAGNOSIS — L70.0 ACNE VULGARIS: ICD-10-CM

## 2024-12-11 RX ORDER — ADAPALENE GEL USP, 0.3% 3 MG/G
GEL TOPICAL
Qty: 45 G | Refills: 1 | OUTPATIENT
Start: 2024-12-11

## 2024-12-12 ENCOUNTER — TELEPHONE (OUTPATIENT)
Dept: FAMILY MEDICINE | Facility: CLINIC | Age: 29
End: 2024-12-12

## 2024-12-12 NOTE — TELEPHONE ENCOUNTER
Prior Authorization Retail Medication Request    Medication/Dose: adapalene (DIFFERIN) 0.3 % external gel  Diagnosis and ICD code (if different than what is on RX):  Same  New/renewal/insurance change PA/secondary ins. PA: New  Previously Tried and Failed: OTC Differin gel 0.1%  Rationale:  Was not effective at controlling her acne symptoms    Insurance   Primary: Same  Insurance ID:  Same    Secondary (if applicable): N/A  Insurance ID:  N/A    Pharmacy Information (if different than what is on RX)  Name:  Same  Phone:  Same  Fax:  Same    Clinic Information  Preferred routing pool for dept communication: [facility] NURSE POOL    Shree DMAON, RN  12/12/24 4:05 PM

## 2024-12-16 NOTE — TELEPHONE ENCOUNTER
Prior Authorization Not Needed per Insurance    Medication: ADAPALENE 0.3 % EX GEL  Insurance Company: [employer] - Phone [phone] Fax [phone]  Expected CoPay: $    Pharmacy Filling the Rx: [id number] - [place], [place] - [address]  Pharmacy Notified: YES  Patient Notified: **Instructed pharmacy to notify patient when script is ready to /ship.**

## 2025-04-11 ENCOUNTER — TELEPHONE (OUTPATIENT)
Dept: FAMILY MEDICINE | Facility: CLINIC | Age: 30
End: 2025-04-11

## 2025-04-11 DIAGNOSIS — L70.0 ACNE VULGARIS: ICD-10-CM

## 2025-04-11 NOTE — TELEPHONE ENCOUNTER
Prior Authorization Retail Medication Request    Medication/Dose: adapalene (DIFFERIN) 0.3 % external gel  Diagnosis and ICD code (if different than what is on RX):  Same  New/renewal/insurance change PA/secondary ins. PA: New - previously approved in December 2024 but pt insurance has changed. Chart has been updated with current insurance.  Previously Tried and Failed:  OTC Differin gel 0.1%   Rationale:  Was not effective at controlling her acne symptoms     Insurance   Primary: Same  Insurance ID:  Same    Secondary (if applicable): N/A  Insurance ID:  N/A    Pharmacy Information (if different than what is on RX)  Name:  Same  Phone:  Same  Fax: Same    Clinic Information  Preferred routing pool for dept communication: [facility] NURSE POOL    Shree DAMON, RN  04/11/25 10:46 AM

## 2025-04-15 NOTE — TELEPHONE ENCOUNTER
Central Prior Authorization Team   Phone: [phone]    PA Initiation    Medication: adapalene (DIFFERIN) 0.3 % external gel  Insurance Company: [employer] - Phone [phone] Fax [phone]  Pharmacy Filling the Rx: [id number] - [place], [place] - [address]  Filling Pharmacy Phone: [phone]  Filling Pharmacy Fax:    Start Date: 4/15/2025

## 2025-04-16 NOTE — TELEPHONE ENCOUNTER
Prior Authorization Approval    Authorization Effective Date: 3/16/2025  Authorization Expiration Date: 4/14/2030  Medication: adapalene (DIFFERIN) 0.3 % external gel-PA APPROVED   Approved Dose/Quantity:   Reference #:     Insurance Company: [employer] - Phone [phone] Fax [phone]  Expected CoPay:       CoPay Card Available:      Foundation Assistance Needed:    Which Pharmacy is filling the prescription (Not needed for infusion/clinic administered): [id number] [address]  Pharmacy Notified:  Yes- **Instructed pharmacy to notify patient when script is ready to /ship.**- Pharmacy stated that they would need a new script be sent to filling pharmacy to process medication due to no more refills remaining to process medication.   Patient Notified:  Yes

## 2025-04-17 RX ORDER — ADAPALENE GEL USP, 0.3% 3 MG/G
GEL TOPICAL
Qty: 45 G | Refills: 1 | Status: SHIPPED | OUTPATIENT
Start: 2025-04-17